# Patient Record
Sex: MALE | Race: WHITE | Employment: OTHER | URBAN - METROPOLITAN AREA
[De-identification: names, ages, dates, MRNs, and addresses within clinical notes are randomized per-mention and may not be internally consistent; named-entity substitution may affect disease eponyms.]

---

## 2019-09-27 ENCOUNTER — ASSISTED LIVING VISIT (OUTPATIENT)
Dept: GERIATRICS | Facility: CLINIC | Age: 74
End: 2019-09-27
Payer: COMMERCIAL

## 2019-09-27 VITALS
OXYGEN SATURATION: 98 % | SYSTOLIC BLOOD PRESSURE: 128 MMHG | DIASTOLIC BLOOD PRESSURE: 84 MMHG | HEART RATE: 64 BPM | RESPIRATION RATE: 18 BRPM

## 2019-09-27 DIAGNOSIS — I10 ESSENTIAL HYPERTENSION: Primary | ICD-10-CM

## 2019-09-27 DIAGNOSIS — G31.09 FRONTAL LOBE DEMENTIA (H): ICD-10-CM

## 2019-09-27 DIAGNOSIS — Z71.89 ADVANCED DIRECTIVES, COUNSELING/DISCUSSION: ICD-10-CM

## 2019-09-27 DIAGNOSIS — F02.80 FRONTAL LOBE DEMENTIA (H): ICD-10-CM

## 2019-09-27 DIAGNOSIS — E78.5 HYPERLIPIDEMIA, UNSPECIFIED HYPERLIPIDEMIA TYPE: ICD-10-CM

## 2019-09-27 DIAGNOSIS — E11.9 DIABETES MELLITUS TYPE 2 WITHOUT RETINOPATHY (H): ICD-10-CM

## 2019-09-27 PROBLEM — F41.1 GENERALIZED ANXIETY DISORDER: Status: ACTIVE | Noted: 2019-09-27

## 2019-09-27 PROBLEM — F32.A DEPRESSION: Status: ACTIVE | Noted: 2019-09-27

## 2019-09-27 PROCEDURE — 99207 ZZC CDG-HISTORY COMP: MEETS EXP. PROBLEM FOCUSED - DOWN CODED LACK OF HPI: CPT | Performed by: NURSE PRACTITIONER

## 2019-09-27 RX ORDER — SIMVASTATIN 20 MG
20 TABLET ORAL AT BEDTIME
COMMUNITY
End: 2021-01-01

## 2019-09-27 RX ORDER — DOCUSATE SODIUM 100 MG/1
100 CAPSULE, LIQUID FILLED ORAL DAILY
COMMUNITY

## 2019-09-27 RX ORDER — QUETIAPINE FUMARATE 25 MG/1
25 TABLET, FILM COATED ORAL 2 TIMES DAILY
COMMUNITY
End: 2019-10-10

## 2019-09-27 RX ORDER — LISINOPRIL AND HYDROCHLOROTHIAZIDE 20; 25 MG/1; MG/1
1 TABLET ORAL DAILY
COMMUNITY

## 2019-09-27 RX ORDER — ATENOLOL 50 MG/1
50 TABLET ORAL DAILY
COMMUNITY
End: 2020-10-21

## 2019-09-27 RX ORDER — DONEPEZIL HYDROCHLORIDE 10 MG/1
10 TABLET, FILM COATED ORAL AT BEDTIME
COMMUNITY
End: 2021-01-01

## 2019-09-27 NOTE — PROGRESS NOTES
[facility]  PRIMARY CARE PROVIDER AND CLINIC:  Timur Ram, SAVANNAH CNP, [address] / [place] [address]  Chief Complaint   Patient presents with     Establish Care     [place] Medical Record Number:  [id number]  [facility] where encounter took place[facility] [[id number]]    Eduardo Singh  is a 73 year old  (1945), admitted to the above facility from home..  Admitted to this facility for  rehab, medical management and nursing care.    HPI:    HPI information obtained from: facility chart records, facility staff, patient report, [facility] chart review, Care Everywhere Epic chart review and family/first contact spouse report.   Brief Summary of Hospital Course:   HTN: cont. On atenolol, zestoretic. BP, HRs stable    Dementia: seen by neurology, started on seroquel 8/19 per neurology.  Has had recent increase agitation at times, typically directed towards spouse. Has exp. Aphasia, easily frustrated when unable to communicate effectively. Cont. On zoloft and aricept.    DMII. Per chart has had impaired glucose fasting. 2/11/19 hgba1c 6.3%. not currently taking DM med.  Po intake has been stable    Hyperlipidemia. Cont. On zocor. No recent FLP    ACP. DNR/DNI          Updates on Status Since Skilled nursing Admission: occ increased agitation, verbal outbursts.  Exp. Aphasia. No reports of insomnia.     CODE STATUS/ADVANCE DIRECTIVES DISCUSSION:   DNR / DNI  Patient's living condition: lives with spouse  ALLERGIES: Patient has no known allergies.  PAST MEDICAL HISTORY:  has a past medical history of Alzheimer disease, Diabetes (H), Erectile dysfunction, Hyperlipidemia, Hypertension, and Impaired fasting glucose.  PAST SURGICAL HISTORY:   has a past surgical history that includes CERVICAL LAMINECTOMY.  FAMILY HISTORY: family history includes Cataracts in his mother; Dementia in his father; Diabetes in his father; Hypertension in his father; Kidney  Disease in his father; Other - See Comments in his mother.  SOCIAL HISTORY:   reports that he has quit smoking. His smoking use included cigarettes. He smoked 0.00 packs per day. He has never used smokeless tobacco.    Post Discharge Medication Reconciliation Status: discharge medications reconciled, continue medications without change    Current Outpatient Medications   Medication Sig Dispense Refill     atenolol (TENORMIN) 50 MG tablet Take 50 mg by mouth daily       docusate sodium (COLACE) 100 MG capsule Take 100 mg by mouth daily       donepezil (ARICEPT) 10 MG tablet Take 10 mg by mouth At Bedtime       lisinopril-hydrochlorothiazide (PRINZIDE/ZESTORETIC) 20-25 MG tablet Take 1 tablet by mouth daily       QUEtiapine (SEROQUEL) 25 MG tablet Take 25 mg by mouth 2 times daily       sertraline (ZOLOFT) 50 MG tablet Take 75 mg by mouth daily       simvastatin (ZOCOR) 20 MG tablet Take 20 mg by mouth At Bedtime         ROS:  Unobtainable secondary to cognitive impairment. Reports feeling fine today    Vitals:  /84   Pulse 64   Resp 18   SpO2 98%   Exam:  GENERAL APPEARANCE:  Alert, in no distress, cooperative  ENT:  Mouth and posterior oropharynx normal, moist mucous membranes, [facility], adequate dentition  EYES:  EOM, conjunctivae, lids, pupils and irises normal, PERRL, no drainage  NECK:  No adenopathy,masses or thyromegaly, no carotid bruit, FROM  RESP:  respiratory effort and palpation of chest normal, lungs clear to auscultation , no respiratory distress  CV:  Palpation and auscultation of heart done , regular rate and rhythm, no murmur, rub, or gallop, no edema  ABDOMEN:  normal bowel sounds, soft, nontender, no hepatosplenomegaly or other masses, no guarding or rebound, no bruits  LYMPHATICS:  No adenopathy in neck , No adenopathy in axillae  M/S:   Gait and station normal  muscle strength 5/5 all 4 ext., normal tone  SKIN:  red skin dorsal surface R hand, chronic purpura  NEURO:   Cranial nerves 2-12 are  normal tested and grossly at patient's baseline, alert, exp. aphasia  PSYCH:  insight and judgement impaired, memory impaired , affect and mood normal    Lab/Diagnostic data:  Recent labs in [facility] reviewed by me today.     ASSESSMENT/PLAN:  Essential hypertension  Currently controlled  1. Cont. Atenolol, zestoretic  2. Follow BPs, HRs  3. Cbc, bmp next week    Frontal lobe dementia (H)  Memory loss, increased agitation at times. Exp. Aphasia  1. Cont. seroquel  2. Cont. Aricept, zoloft  3. Monitor for increased agitation, anxiety, changes in behavior  4. Cont. Secured memory care unit  5. Cont. To invite to activities, tends to isolate in room    Diabetes mellitus type 2 without retinopathy (H)  H/o impaired fasting glucose. Last hgba1c 6.3%. seroquel recently started  1. Bmp next week  2. hgba1c next week  3. For further elevated a1c, may consider metformin    Hyperlipidemia, unspecified hyperlipidemia type  No recent FLP. No current GI distress  1. Cont. zocor  2. FLP next week  3. AST,ALT next week    Advanced directives, counseling/discussion  Spoke with spouse. Cont. DNR/DNI. POLST in chart       Electronically signed by:  SAVANNAH Seymour CNP

## 2019-09-27 NOTE — LETTER
9/27/2019        RE: Eduardo [place]  [address] [place]  [facility] [address]        [facility]  PRIMARY CARE PROVIDER AND CLINIC:  Timur Ram, SAVANNAH CNP, [address] / [place] [address]  Chief Complaint   Patient presents with     Establish Care     [place] Medical Record Number:  [id number]  [facility] of Service where encounter took place:  [facility] ([facility] [[id number]]    Eduardo Singh  is a 73 year old  (1945), admitted to the above facility from home..  Admitted to this facility for  rehab, medical management and nursing care.    HPI:    HPI information obtained from: facility chart records, facility staff, patient report, [facility] chart review, Care Everywhere Epic chart review and family/first contact spouse report.   Brief Summary of Hospital Course:   HTN: cont. On atenolol, zestoretic. BP, HRs stable    Dementia: seen by neurology, started on seroquel 8/19 per neurology.  Has had recent increase agitation at times, typically directed towards spouse. Has exp. Aphasia, easily frustrated when unable to communicate effectively. Cont. On zoloft and aricept.    DMII. Per chart has had impaired glucose fasting. 2/11/19 hgba1c 6.3%. not currently taking DM med.  Po intake has been stable    Hyperlipidemia. Cont. On zocor. No recent FLP    ACP. DNR/DNI          Updates on Status Since Skilled nursing Admission: occ increased agitation, verbal outbursts.  Exp. Aphasia. No reports of insomnia.     CODE STATUS/ADVANCE DIRECTIVES DISCUSSION:   DNR / DNI  Patient's living condition: lives with spouse  ALLERGIES: Patient has no known allergies.  PAST MEDICAL HISTORY:  has a past medical history of Alzheimer disease, Diabetes (H), Erectile dysfunction, Hyperlipidemia, Hypertension, and Impaired fasting glucose.  PAST SURGICAL HISTORY:   has a past surgical history that includes CERVICAL LAMINECTOMY.  FAMILY HISTORY: family history  includes Cataracts in his mother; Dementia in his father; Diabetes in his father; Hypertension in his father; Kidney Disease in his father; Other - See Comments in his mother.  SOCIAL HISTORY:   reports that he has quit smoking. His smoking use included cigarettes. He smoked 0.00 packs per day. He has never used smokeless tobacco.    Post Discharge Medication Reconciliation Status: discharge medications reconciled, continue medications without change    Current Outpatient Medications   Medication Sig Dispense Refill     atenolol (TENORMIN) 50 MG tablet Take 50 mg by mouth daily       docusate sodium (COLACE) 100 MG capsule Take 100 mg by mouth daily       donepezil (ARICEPT) 10 MG tablet Take 10 mg by mouth At Bedtime       lisinopril-hydrochlorothiazide (PRINZIDE/ZESTORETIC) 20-25 MG tablet Take 1 tablet by mouth daily       QUEtiapine (SEROQUEL) 25 MG tablet Take 25 mg by mouth 2 times daily       sertraline (ZOLOFT) 50 MG tablet Take 75 mg by mouth daily       simvastatin (ZOCOR) 20 MG tablet Take 20 mg by mouth At Bedtime         ROS:  Unobtainable secondary to cognitive impairment. Reports feeling fine today    Vitals:  /84   Pulse 64   Resp 18   SpO2 98%   Exam:  GENERAL APPEARANCE:  Alert, in no distress, cooperative  ENT:  Mouth and posterior oropharynx normal, moist mucous membranes, [employer], adequate dentition  EYES:  EOM, conjunctivae, lids, pupils and irises normal, PERRL, no drainage  NECK:  No adenopathy,masses or thyromegaly, no carotid bruit, FROM  RESP:  respiratory effort and palpation of chest normal, lungs clear to auscultation , no respiratory distress  CV:  Palpation and auscultation of heart done , regular rate and rhythm, no murmur, rub, or gallop, no edema  ABDOMEN:  normal bowel sounds, soft, nontender, no hepatosplenomegaly or other masses, no guarding or rebound, no bruits  LYMPHATICS:  No adenopathy in neck , No adenopathy in axillae  M/S:   Gait and station normal  muscle strength  5/5 all 4 ext., normal tone  SKIN:  red skin dorsal surface R hand, chronic purpura  NEURO:   Cranial nerves 2-12 are normal tested and grossly at patient's baseline, alert, exp. aphasia  PSYCH:  insight and judgement impaired, memory impaired , affect and mood normal    Lab/Diagnostic data:  Recent labs in [facility] reviewed by me today.     ASSESSMENT/PLAN:  Essential hypertension  Currently controlled  1. Cont. Atenolol, zestoretic  2. Follow BPs, HRs  3. Cbc, bmp next week    Frontal lobe dementia (H)  Memory loss, increased agitation at times. Exp. Aphasia  1. Cont. seroquel  2. Cont. Aricept, zoloft  3. Monitor for increased agitation, anxiety, changes in behavior  4. Cont. Secured memory care unit  5. Cont. To invite to activities, tends to isolate in room    Diabetes mellitus type 2 without retinopathy (H)  H/o impaired fasting glucose. Last hgba1c 6.3%. seroquel recently started  1. Bmp next week  2. hgba1c next week  3. For further elevated a1c, may consider metformin    Hyperlipidemia, unspecified hyperlipidemia type  No recent FLP. No current GI distress  1. Cont. zocor  2. FLP next week  3. AST,ALT next week    Advanced directives, counseling/discussion  Spoke with spouse. Cont. DNR/DNI. POLST in chart       Electronically signed by:  SAVANNAH Seymour CNP                       Sincerely,        SAVANNAH Seymour CNP

## 2019-10-01 ENCOUNTER — TRANSFERRED RECORDS (OUTPATIENT)
Dept: HEALTH INFORMATION MANAGEMENT | Facility: CLINIC | Age: 74
End: 2019-10-01

## 2019-10-01 ENCOUNTER — RECORDS - HEALTHEAST (OUTPATIENT)
Dept: LAB | Facility: CLINIC | Age: 74
End: 2019-10-01

## 2019-10-01 LAB
ALT SERPL W P-5'-P-CCNC: 21 U/L (ref 0–45)
ALT SERPL-CCNC: 21 U/L (ref 0–45)
ANION GAP SERPL CALCULATED.3IONS-SCNC: 10 MMOL/L (ref 5–18)
ANION GAP SERPL CALCULATED.3IONS-SCNC: 10 MMOL/L (ref 5–18)
AST SERPL W P-5'-P-CCNC: 24 U/L (ref 0–40)
AST SERPL-CCNC: 24 U/L (ref 0–40)
BUN SERPL-MCNC: 19 MG/DL (ref 8–28)
BUN SERPL-MCNC: 19 MG/DL (ref 8–28)
CALCIUM SERPL-MCNC: 9.6 MG/DL (ref 8.5–10.5)
CALCIUM SERPL-MCNC: 9.6 MG/DL (ref 8.5–10.5)
CHLORIDE BLD-SCNC: 94 MMOL/L (ref 98–107)
CHLORIDE SERPLBLD-SCNC: 94 MMOL/L (ref 98–107)
CHOLEST SERPL-MCNC: 170 MG/DL
CHOLEST SERPL-MCNC: 170 MG/DL
CO2 SERPL-SCNC: 28 MMOL/L (ref 22–31)
CO2 SERPL-SCNC: 28 MMOL/L (ref 22–31)
CREAT SERPL-MCNC: 1.21 MG/DL (ref 0.7–1.3)
CREAT SERPL-MCNC: 1.21 MG/DL (ref 0.7–1.3)
ERYTHROCYTE [DISTWIDTH] IN BLOOD BY AUTOMATED COUNT: 11.9 % (ref 11–14.5)
ERYTHROCYTE [DISTWIDTH] IN BLOOD BY AUTOMATED COUNT: 11.9 % (ref 11–14.5)
FASTING STATUS PATIENT QL REPORTED: YES
GFR SERPL CREATININE-BSD FRML MDRD: 59 ML/MIN/1.73M2
GFR SERPL CREATININE-BSD FRML MDRD: 59 ML/MIN/1.73M2
GLUCOSE BLD-MCNC: 118 MG/DL (ref 70–125)
GLUCOSE SERPL-MCNC: 118 MG/DL (ref 70–125)
HBA1C MFR BLD: 6.4 % (ref 4.2–6.1)
HCT VFR BLD AUTO: 43.7 % (ref 40–54)
HCT VFR BLD AUTO: 43.7 % (ref 40–54)
HDLC SERPL-MCNC: 55 MG/DL
HDLC SERPL-MCNC: 55 MG/DL
HEMOGLOBIN: 14.4 G/DL (ref 14–18)
HGB BLD-MCNC: 14.4 G/DL (ref 14–18)
LDLC SERPL CALC-MCNC: 85 MG/DL
LDLC SERPL CALC-MCNC: 85 MG/DL
MCH RBC QN AUTO: 30.3 PG (ref 27–34)
MCH RBC QN AUTO: 30.3 PG (ref 27–34)
MCHC RBC AUTO-ENTMCNC: 33 G/DL (ref 32–36)
MCHC RBC AUTO-ENTMCNC: 33 G/DL (ref 32–36)
MCV RBC AUTO: 92 FL (ref 80–100)
MCV RBC AUTO: 92 FL (ref 80–100)
PLATELET # BLD AUTO: 293 THOU/UL (ref 140–440)
PLATELET # BLD AUTO: 293 THOU/UL (ref 140–440)
PMV BLD AUTO: 9.3 FL (ref 8.5–12.5)
POTASSIUM BLD-SCNC: 3.8 MMOL/L (ref 3.5–5)
POTASSIUM SERPL-SCNC: 3.8 MMOL/L (ref 3.5–5)
RBC # BLD AUTO: 4.76 MILL/UL (ref 4.4–6.2)
RBC # BLD AUTO: 4.76 MILL/UL (ref 4.4–6.2)
SODIUM SERPL-SCNC: 132 MMOL/L (ref 136–145)
SODIUM SERPL-SCNC: 132 MMOL/L (ref 136–145)
TRIGL SERPL-MCNC: 150 MG/DL
TRIGL SERPL-MCNC: 150 MG/DL
WBC # BLD AUTO: 9.1 THOU/UL (ref 4–11)
WBC: 9.1 THOU/UL (ref 4–11)

## 2019-10-02 LAB — HBA1C MFR BLD: 6.4 % (ref 4.2–6.1)

## 2019-10-09 ENCOUNTER — ASSISTED LIVING VISIT (OUTPATIENT)
Dept: GERIATRICS | Facility: CLINIC | Age: 74
End: 2019-10-09
Payer: COMMERCIAL

## 2019-10-09 DIAGNOSIS — F41.1 GENERALIZED ANXIETY DISORDER: ICD-10-CM

## 2019-10-09 DIAGNOSIS — G31.09 FRONTAL LOBE DEMENTIA (H): ICD-10-CM

## 2019-10-09 DIAGNOSIS — F02.80 FRONTAL LOBE DEMENTIA (H): ICD-10-CM

## 2019-10-09 DIAGNOSIS — I10 ESSENTIAL HYPERTENSION: Primary | ICD-10-CM

## 2019-10-09 NOTE — LETTER
10/9/2019        RE: Eduardo Cleveland [place]  [address]  [facility] [address]        [facility] [facility]  [place] Medical Record Number:  [id number]  Place of Service where encounter took place:  [facility] ([facility] [[id number]]  Chief Complaint   Patient presents with     Hypertension     Dementia       HPI:    Eduardo Singh  is a 73 year old (1945), who is being seen today for an episodic care visit.  HPI information obtained from: facility chart records, facility staff, patient report and [facility] chart review. Today's concern is: HTN, dementia, anxiety. For HTN cont. On ate olol, lisinopril/hctz. BP sl elevated today, had been stable. Per staff po intake stable. Has had increased anxiety at times, labile mood. Strikes out at staff at times, appears suspicious of staff when taking meds at times, med refusal at times.  Cont. On zoloft, seroquel. seroquel had george recently started PTA.       Past Medical and Surgical History reviewed in Epic today.    MEDICATIONS:  Current Outpatient Medications   Medication Sig Dispense Refill     atenolol (TENORMIN) 50 MG tablet Take 50 mg by mouth daily       docusate sodium (COLACE) 100 MG capsule Take 100 mg by mouth daily       donepezil (ARICEPT) 10 MG tablet Take 10 mg by mouth At Bedtime       lisinopril-hydrochlorothiazide (PRINZIDE/ZESTORETIC) 20-25 MG tablet Take 1 tablet by mouth daily       QUEtiapine (SEROQUEL) 25 MG tablet Take 25 mg by mouth 3 times daily       sertraline (ZOLOFT) 50 MG tablet Take 75 mg by mouth daily       simvastatin (ZOCOR) 20 MG tablet Take 20 mg by mouth At Bedtime           REVIEW OF SYSTEMS:  Unobtainable secondary to cognitive impairment. Reports feeling ok today    Objective:  BP (!) 159/81   Pulse 70   Resp 16   SpO2 96%   Exam:  GENERAL APPEARANCE:  Alert, in no distress, cooperative  ENT:  Mouth and posterior oropharynx normal, moist mucous membranes, normal hearing  acuity  EYES:  EOM, conjunctivae, lids, pupils and irises normal, PERRL  NECK:  No adenopathy,masses or thyromegaly, no carotid bruit  RESP:  respiratory effort and palpation of chest normal, lungs clear to auscultation , no respiratory distress  CV:  Palpation and auscultation of heart done , regular rate and rhythm, no murmur, rub, or gallop, no edema  ABDOMEN:  normal bowel sounds, soft, nontender, no hepatosplenomegaly or other masses, no guarding or rebound  M/S:   Gait and station normal  muscle strength 5/5 all 4 ext., normal tone  NEURO:   Cranial nerves 2-12 are normal tested and grossly at patient's baseline, alert, exp. aphasia  PSYCH:  memory impaired , affect and mood normal    Labs:     Most Recent 3 CBC's:  Recent Labs   Lab Test 10/01/19   WBC 9.1   HGB 14.4   MCV 92        Most Recent 3 BMP's:  Recent Labs   Lab Test 10/01/19   *   POTASSIUM 3.8   CHLORIDE 94*   CO2 28   BUN 19   CR 1.21   ANIONGAP 10   ALOK 9.6        Most Recent Hemoglobin A1c:  Recent Labs   Lab Test 10/01/19   A1C 6.4*       ASSESSMENT/PLAN:  Essential hypertension  Elevated today  1. Cont. Atenolol  2. Cont. Lisinopril/hctz  3. Follow BPs, HRs  4. Reassess over next couple weeks  5. For further elevated BPs, may increase BP meds  6. Bmp in next 1-3 mos    Frontal lobe dementia (H)  Increased aggressive behaviors at times, paranoia  1. Increase seroquel to 25 mg tid  2. Monitor for further aggressive behaviors  3. Reassess over next couple weeks  4. For ongoing target behaviors may further increased seroquel    Generalized anxiety disorder  Ongoing s/s  1. Cont zoloft  2. Monitor for further anxiety over next couple weeks  3. For ongoing s/s may consider prn neurontin/increased zoloft dose      Electronically signed by:  SAVANNAH Seymour CNP             Sincerely,        SAVANNAH Seymour CNP

## 2019-10-09 NOTE — PROGRESS NOTES
[place] [facility]  [place] Medical Record Number:  [id number]  Place of Service where encounter took place:  [facility] ([facility] [[id number]]  Chief Complaint   Patient presents with     Hypertension     Dementia       HPI:    Eduardo Singh  is a 73 year old (1945), who is being seen today for an episodic care visit.  HPI information obtained from: facility chart records, facility staff, patient report and [facility] chart review. Today's concern is: HTN, dementia, anxiety. For HTN cont. On ate olol, lisinopril/hctz. BP sl elevated today, had been stable. Per staff po intake stable. Has had increased anxiety at times, labile mood. Strikes out at staff at times, appears suspicious of staff when taking meds at times, med refusal at times.  Cont. On zoloft, seroquel. seroquel had george recently started PTA.       Past Medical and Surgical History reviewed in Epic today.    MEDICATIONS:  Current Outpatient Medications   Medication Sig Dispense Refill     atenolol (TENORMIN) 50 MG tablet Take 50 mg by mouth daily       docusate sodium (COLACE) 100 MG capsule Take 100 mg by mouth daily       donepezil (ARICEPT) 10 MG tablet Take 10 mg by mouth At Bedtime       lisinopril-hydrochlorothiazide (PRINZIDE/ZESTORETIC) 20-25 MG tablet Take 1 tablet by mouth daily       QUEtiapine (SEROQUEL) 25 MG tablet Take 25 mg by mouth 3 times daily       sertraline (ZOLOFT) 50 MG tablet Take 75 mg by mouth daily       simvastatin (ZOCOR) 20 MG tablet Take 20 mg by mouth At Bedtime           REVIEW OF SYSTEMS:  Unobtainable secondary to cognitive impairment. Reports feeling ok today    Objective:  BP (!) 159/81   Pulse 70   Resp 16   SpO2 96%   Exam:  GENERAL APPEARANCE:  Alert, in no distress, cooperative  ENT:  Mouth and posterior oropharynx normal, moist mucous membranes, normal hearing acuity  EYES:  EOM, conjunctivae, lids, pupils and irises normal, PERRL  NECK:  No adenopathy,masses or thyromegaly, no  carotid bruit  RESP:  respiratory effort and palpation of chest normal, lungs clear to auscultation , no respiratory distress  CV:  Palpation and auscultation of heart done , regular rate and rhythm, no murmur, rub, or gallop, no edema  ABDOMEN:  normal bowel sounds, soft, nontender, no hepatosplenomegaly or other masses, no guarding or rebound  M/S:   Gait and station normal  muscle strength 5/5 all 4 ext., normal tone  NEURO:   Cranial nerves 2-12 are normal tested and grossly at patient's baseline, alert, exp. aphasia  PSYCH:  memory impaired , affect and mood normal    Labs:     Most Recent 3 CBC's:  Recent Labs   Lab Test 10/01/19   WBC 9.1   HGB 14.4   MCV 92        Most Recent 3 BMP's:  Recent Labs   Lab Test 10/01/19   *   POTASSIUM 3.8   CHLORIDE 94*   CO2 28   BUN 19   CR 1.21   ANIONGAP 10   ALOK 9.6        Most Recent Hemoglobin A1c:  Recent Labs   Lab Test 10/01/19   A1C 6.4*       ASSESSMENT/PLAN:  Essential hypertension  Elevated today  1. Cont. Atenolol  2. Cont. Lisinopril/hctz  3. Follow BPs, HRs  4. Reassess over next couple weeks  5. For further elevated BPs, may increase BP meds  6. Bmp in next 1-3 mos    Frontal lobe dementia (H)  Increased aggressive behaviors at times, paranoia  1. Increase seroquel to 25 mg tid  2. Monitor for further aggressive behaviors  3. Reassess over next couple weeks  4. For ongoing target behaviors may further increased seroquel    Generalized anxiety disorder  Ongoing s/s  1. Cont zoloft  2. Monitor for further anxiety over next couple weeks  3. For ongoing s/s may consider prn neurontin/increased zoloft dose      Electronically signed by:  SAVANNAH Seymour CNP

## 2019-10-10 VITALS
HEART RATE: 70 BPM | DIASTOLIC BLOOD PRESSURE: 81 MMHG | RESPIRATION RATE: 16 BRPM | SYSTOLIC BLOOD PRESSURE: 159 MMHG | OXYGEN SATURATION: 96 %

## 2019-10-10 RX ORDER — QUETIAPINE FUMARATE 25 MG/1
25 TABLET, FILM COATED ORAL 3 TIMES DAILY
COMMUNITY
End: 2020-01-27

## 2019-11-01 ENCOUNTER — ASSISTED LIVING VISIT (OUTPATIENT)
Dept: GERIATRICS | Facility: CLINIC | Age: 74
End: 2019-11-01
Payer: COMMERCIAL

## 2019-11-01 VITALS
TEMPERATURE: 98.2 F | HEART RATE: 62 BPM | DIASTOLIC BLOOD PRESSURE: 70 MMHG | RESPIRATION RATE: 18 BRPM | SYSTOLIC BLOOD PRESSURE: 126 MMHG | WEIGHT: 174 LBS

## 2019-11-01 DIAGNOSIS — N18.30 TYPE 2 DIABETES MELLITUS WITH STAGE 3 CHRONIC KIDNEY DISEASE, WITHOUT LONG-TERM CURRENT USE OF INSULIN (H): ICD-10-CM

## 2019-11-01 DIAGNOSIS — E78.5 HYPERLIPIDEMIA, UNSPECIFIED HYPERLIPIDEMIA TYPE: ICD-10-CM

## 2019-11-01 DIAGNOSIS — F22 PARANOIA (H): ICD-10-CM

## 2019-11-01 DIAGNOSIS — I10 ESSENTIAL HYPERTENSION: Primary | ICD-10-CM

## 2019-11-01 DIAGNOSIS — E11.22 TYPE 2 DIABETES MELLITUS WITH STAGE 3 CHRONIC KIDNEY DISEASE, WITHOUT LONG-TERM CURRENT USE OF INSULIN (H): ICD-10-CM

## 2019-11-01 DIAGNOSIS — F02.80 FRONTAL LOBE DEMENTIA (H): ICD-10-CM

## 2019-11-01 DIAGNOSIS — G31.09 FRONTAL LOBE DEMENTIA (H): ICD-10-CM

## 2019-11-01 DIAGNOSIS — F41.1 GENERALIZED ANXIETY DISORDER: ICD-10-CM

## 2019-11-01 NOTE — LETTER
11/1/2019        RE: Eduardo Singh  [facility]  [address] [place]  [facility] [address]        Eduardo Singh is a 73 year old male seen November 1, 2019 at [facility] where he has resided for 1 month (admit 9/2019) seen for initial visit.   He is seen in his room, ambulatory without device.   States he feels well, pleasant and smiling.   Significant impairment of his verbal skills, but says no to any pain or discomfort.     By chart review, patient has had progressive cognitive decline over the past 8 years, particularly with language.   He was seen by Neurology in 10/2013, noted to have a + family history of Alzheimer's dementia in first degree relatives    Pt thought to have early onset Alzheimer's dementia vs frontotemporal dementia and started on donepezil.   He had gradual progression until this past year, when he had more rapid changes and also developed agitation    He was started on quetiapine by his Neurologist    He initially admitted to [facility] for a respite stay, but wife has decided to have patient stay long term.  He had some trouble with transition at which time he was more paranoid and combative, and quetiapine dose was increased.    He goes to an Adult Day program, [facility] in [place], twice a week.       Past Medical History:   Diagnosis Date     Alzheimer disease (H)      Diabetes (H)      Erectile dysfunction      Hyperlipidemia      Hypertension      Impaired fasting glucose      Past Surgical History:   Procedure Laterality Date     CERVICAL LAMINECTOMY       SH:  Previously lived with his wife in [place]. They have 3 children.   Pt is retired from work as an  and controller.    ROS: unable to obtain secondary to aphasia.      EXAM:  NAD  /70   Pulse 62   Temp 98.2  F (36.8  C)   Resp 18   Wt 78.9 kg (174 lb)    Neck supple without adenopathy  Lungs clear bilaterally with fair air movement  Heart RRR s1s2  Abd soft, NT, no  distention, +BS  Ext without edema  Neuro: +aphasia, steady gait  Psych: affect okay, smiling    Last Comprehensive Metabolic Panel:  Sodium   Date Value Ref Range Status   10/01/2019 132 (A) 136 - 145 mmol/L Final     Potassium   Date Value Ref Range Status   10/01/2019 3.8 3.5 - 5.0 mmol/L Final     Chloride   Date Value Ref Range Status   10/01/2019 94 (A) 98 - 107 mmol/L Final     Carbon Dioxide   Date Value Ref Range Status   10/01/2019 28 22 - 31 mmol/L Final     Anion Gap   Date Value Ref Range Status   10/01/2019 10 5 - 18 mmol/L Final     Glucose   Date Value Ref Range Status   10/01/2019 118 70 - 125 mg/dL Final     Urea Nitrogen   Date Value Ref Range Status   10/01/2019 19 8 - 28 mg/dL Final     Creatinine   Date Value Ref Range Status   10/01/2019 1.21 0.70 - 1.30 mg/dL Final     GFR Estimate   Date Value Ref Range Status   10/01/2019 59 (L) >60 ml/min/1.73m2 Final     Calcium   Date Value Ref Range Status   10/01/2019 9.6 8.5 - 10.5 mg/dL Final     Lab Results   Component Value Date    AST 24 10/01/2019     Lab Results   Component Value Date    ALT 21 10/01/2019     Lab Results   Component Value Date    WBC 9.1 10/01/2019      HGB 14.4 10/01/2019      MCV 92 10/01/2019       10/01/2019     Lab Results   Component Value Date    CHOL 170 10/01/2019     Lab Results   Component Value Date    HDL 55 10/01/2019     Lab Results   Component Value Date    LDL 85 10/01/2019     Lab Results   Component Value Date    TRIG 150 10/01/2019       IMP/PLAN:     (I10) Essential hypertension    Comment:   BP Readings from Last 3 Encounters:   11/01/19 126/70   10/09/19 (!) 159/81   09/27/19 128/84      Pulse Readings from Last 4 Encounters:   11/01/19 62   10/09/19 70   09/27/19 64      Plan: lisinopril 20 mg/day, atenolol 50 mg daily and hydrochlorothiazide 25 mg/day    Will need to monitor for bradycardia on both atenolol and donepezil, and follow Na on hydrochlorothiazide.   Would look to decreasing dose if Na  falls further.       (E11.22,  N18.3) Type 2 diabetes mellitus with stage 3 chronic kidney disease, without long-term current use of insulin (H)  Comment:   Lab Results   Component Value Date    A1C 6.4 10/01/2019   Plan: diet-controlled.     He is on an ACEI and statin.        (F22) Paranoia (H)  Comment: with agitation and combativeness related to his worsening dementia    Plan: quetiapine 25 mg tid.  May be able to try GDR once he is settled.        (F41.1) Generalized anxiety disorder  Comment: worsened with cognitive decline     Plan: sertraline 75 mg/day        (G31.09,  F02.80) Frontal lobe dementia (H)  Comment: with loss of language and low functional status     Plan: [facility] for assist with meds, meals, activity.   He has been on donepezil >5 years, currently on 10 mg/day.    Monitor for bradycardia / GI symptoms.       Claudia Pascual MD       Sincerely,        Claudia Pascual MD

## 2019-11-05 PROBLEM — E11.22 TYPE 2 DIABETES MELLITUS WITH STAGE 3 CHRONIC KIDNEY DISEASE, WITHOUT LONG-TERM CURRENT USE OF INSULIN (H): Status: ACTIVE | Noted: 2019-11-05

## 2019-11-05 PROBLEM — N18.30 TYPE 2 DIABETES MELLITUS WITH STAGE 3 CHRONIC KIDNEY DISEASE, WITHOUT LONG-TERM CURRENT USE OF INSULIN (H): Status: ACTIVE | Noted: 2019-11-05

## 2019-12-17 ENCOUNTER — PATIENT OUTREACH (OUTPATIENT)
Dept: GERIATRIC MEDICINE | Facility: CLINIC | Age: 74
End: 2019-12-17

## 2020-01-01 ENCOUNTER — ASSISTED LIVING VISIT (OUTPATIENT)
Dept: GERIATRICS | Facility: CLINIC | Age: 75
End: 2020-01-01
Payer: COMMERCIAL

## 2020-01-01 ENCOUNTER — VIRTUAL VISIT (OUTPATIENT)
Dept: GERIATRICS | Facility: CLINIC | Age: 75
End: 2020-01-01
Payer: COMMERCIAL

## 2020-01-01 VITALS
SYSTOLIC BLOOD PRESSURE: 115 MMHG | HEART RATE: 59 BPM | DIASTOLIC BLOOD PRESSURE: 81 MMHG | OXYGEN SATURATION: 92 % | RESPIRATION RATE: 16 BRPM

## 2020-01-01 VITALS
OXYGEN SATURATION: 93 % | DIASTOLIC BLOOD PRESSURE: 64 MMHG | HEART RATE: 60 BPM | RESPIRATION RATE: 18 BRPM | SYSTOLIC BLOOD PRESSURE: 130 MMHG

## 2020-01-01 VITALS
SYSTOLIC BLOOD PRESSURE: 148 MMHG | HEART RATE: 51 BPM | RESPIRATION RATE: 16 BRPM | DIASTOLIC BLOOD PRESSURE: 82 MMHG | WEIGHT: 160 LBS

## 2020-01-01 DIAGNOSIS — R00.1 BRADYCARDIA: Primary | ICD-10-CM

## 2020-01-01 DIAGNOSIS — G31.09 FRONTAL LOBE DEMENTIA (H): ICD-10-CM

## 2020-01-01 DIAGNOSIS — R00.1 BRADYCARDIA: ICD-10-CM

## 2020-01-01 DIAGNOSIS — M54.50 MIDLINE LOW BACK PAIN WITHOUT SCIATICA, UNSPECIFIED CHRONICITY: Primary | ICD-10-CM

## 2020-01-01 DIAGNOSIS — R63.4 WEIGHT LOSS: Primary | ICD-10-CM

## 2020-01-01 DIAGNOSIS — F02.80 FRONTAL LOBE DEMENTIA (H): ICD-10-CM

## 2020-01-01 RX ORDER — DIVALPROEX SODIUM 125 MG/1
125 TABLET, DELAYED RELEASE ORAL
COMMUNITY

## 2020-01-01 RX ORDER — HALOPERIDOL 0.5 MG/1
0.5 TABLET ORAL DAILY
COMMUNITY
End: 2021-01-01 | Stop reason: ALTCHOICE

## 2020-01-22 ENCOUNTER — ASSISTED LIVING VISIT (OUTPATIENT)
Dept: GERIATRICS | Facility: CLINIC | Age: 75
End: 2020-01-22
Payer: COMMERCIAL

## 2020-01-22 DIAGNOSIS — G31.09 FRONTAL LOBE DEMENTIA (H): ICD-10-CM

## 2020-01-22 DIAGNOSIS — I10 ESSENTIAL HYPERTENSION: ICD-10-CM

## 2020-01-22 DIAGNOSIS — F02.80 FRONTAL LOBE DEMENTIA (H): ICD-10-CM

## 2020-01-22 DIAGNOSIS — F41.1 GENERALIZED ANXIETY DISORDER: Primary | ICD-10-CM

## 2020-01-22 NOTE — LETTER
1/22/2020        RE: Eduardo Cleveland [facility]  [address] [facility] [address]        [place] [facility]  [place] Medical Record Number:  [id number]  Place of Service where encounter took place:  [facility] [[id number]]  Chief Complaint   Patient presents with     Anxiety       HPI:    Eduardo Singh  is a 74 year old (1945), who is being seen today for an episodic care visit.  HPI information obtained from: facility chart records, facility staff and [facility] chart review. Today's concern is: anxiety, dementia, HTN. For past couple weeks has had increased daytime anxiety, aggressive behaviors towards staff at times.  Has utilized prn seroquel which is effective at times.  Earlier this week started on increased seroquel dose. Per staff, mood, behaviors more stable this am.  Also taking zoloft, aricept.  For HTN taking lisinopril/HCTZ, atenolol.       Past Medical and Surgical History reviewed in Epic today.    MEDICATIONS:  Current Outpatient Medications   Medication Sig Dispense Refill     QUEtiapine (SEROQUEL) 50 MG tablet Take 50 mg by mouth 3 times daily       atenolol (TENORMIN) 50 MG tablet Take 50 mg by mouth daily       docusate sodium (COLACE) 100 MG capsule Take 100 mg by mouth daily       donepezil (ARICEPT) 10 MG tablet Take 10 mg by mouth At Bedtime       lisinopril-hydrochlorothiazide (PRINZIDE/ZESTORETIC) 20-25 MG tablet Take 1 tablet by mouth daily       sertraline (ZOLOFT) 50 MG tablet Take 75 mg by mouth daily       simvastatin (ZOCOR) 20 MG tablet Take 20 mg by mouth At Bedtime           REVIEW OF SYSTEMS:  Unobtainable secondary to cognitive impairment.     Objective:  /76   Pulse 74   Resp 16   SpO2 93%   Exam:  GENERAL APPEARANCE:  Alert, in no distress, cooperative  ENT:  Mouth and posterior oropharynx normal, moist mucous membranes, normal hearing acuity  EYES:  EOM, conjunctivae, lids, pupils and irises  normal, PERRL  NECK:  No adenopathy,masses or thyromegaly, no carotid bruit  RESP:  respiratory effort and palpation of chest normal, lungs clear to auscultation , no respiratory distress  CV:  Palpation and auscultation of heart done , regular rate and rhythm, no murmur, rub, or gallop, no edema  ABDOMEN:  normal bowel sounds, soft, nontender, no hepatosplenomegaly or other masses, no guarding or rebound  M/S:   Gait and station normal  muscle strength 5/5 all m4 ext., normal tone  NEURO:   Cranial nerves 2-12 are normal tested and grossly at patient's baseline, alert, aphasia  PSYCH:  affect and mood normal, able to follow commands    Labs:     Most Recent 3 CBC's:  Recent Labs   Lab Test 10/01/19   WBC 9.1   HGB 14.4   MCV 92        Most Recent 3 BMP's:  Recent Labs   Lab Test 10/01/19   *   POTASSIUM 3.8   CHLORIDE 94*   CO2 28   BUN 19   CR 1.21   ANIONGAP 10   ALOK 9.6          ASSESSMENT/PLAN:  Generalized anxiety disorder  Recent increased s/s  1. Cont. zoloft  2. Cont. Increased seroquel dose  3. For further increased s/s, may increase zoloft dose    Frontal lobe dementia (H)  Memory loss, aphasia, recent increased aggressive behaviors, more stable with increased seroquel dose  1. Cont. Sched. seroquel  2. Cont. aricept  3. Reassess behaviors over next week  4. For ongoing target behaviors add prn seroquel    Essential hypertension  Currently stable  1. Cont. Lisinopril/hctz  2. Cont. Atenolol  3. Follow BPs, HRs  4. Bmp in next 1-3 mos        Electronically signed by:  SAVANNAH Seymour CNP               Sincerely,        SAVANNAH Seymour CNP

## 2020-01-22 NOTE — PROGRESS NOTES
[place] [facility]  [place] Medical Record Number:  [id number]  Place of Service where encounter took place:  [facility] [[id number]]  Chief Complaint   Patient presents with     Anxiety       HPI:    Eduardo Singh  is a 74 year old (1945), who is being seen today for an episodic care visit.  HPI information obtained from: facility chart records, facility staff and [facility] chart review. Today's concern is: anxiety, dementia, HTN. For past couple weeks has had increased daytime anxiety, aggressive behaviors towards staff at times.  Has utilized prn seroquel which is effective at times.  Earlier this week started on increased seroquel dose. Per staff, mood, behaviors more stable this am.  Also taking zoloft, aricept.  For HTN taking lisinopril/HCTZ, atenolol.       Past Medical and Surgical History reviewed in Epic today.    MEDICATIONS:  Current Outpatient Medications   Medication Sig Dispense Refill     QUEtiapine (SEROQUEL) 50 MG tablet Take 50 mg by mouth 3 times daily       atenolol (TENORMIN) 50 MG tablet Take 50 mg by mouth daily       docusate sodium (COLACE) 100 MG capsule Take 100 mg by mouth daily       donepezil (ARICEPT) 10 MG tablet Take 10 mg by mouth At Bedtime       lisinopril-hydrochlorothiazide (PRINZIDE/ZESTORETIC) 20-25 MG tablet Take 1 tablet by mouth daily       sertraline (ZOLOFT) 50 MG tablet Take 75 mg by mouth daily       simvastatin (ZOCOR) 20 MG tablet Take 20 mg by mouth At Bedtime           REVIEW OF SYSTEMS:  Unobtainable secondary to cognitive impairment.     Objective:  /76   Pulse 74   Resp 16   SpO2 93%   Exam:  GENERAL APPEARANCE:  Alert, in no distress, cooperative  ENT:  Mouth and posterior oropharynx normal, moist mucous membranes, normal hearing acuity  EYES:  EOM, conjunctivae, lids, pupils and irises normal, PERRL  NECK:  No adenopathy,masses or thyromegaly, no carotid bruit  RESP:  respiratory effort and palpation of chest  normal, lungs clear to auscultation , no respiratory distress  CV:  Palpation and auscultation of heart done , regular rate and rhythm, no murmur, rub, or gallop, no edema  ABDOMEN:  normal bowel sounds, soft, nontender, no hepatosplenomegaly or other masses, no guarding or rebound  M/S:   Gait and station normal  muscle strength 5/5 all m4 ext., normal tone  NEURO:   Cranial nerves 2-12 are normal tested and grossly at patient's baseline, alert, aphasia  PSYCH:  affect and mood normal, able to follow commands    Labs:     Most Recent 3 CBC's:  Recent Labs   Lab Test 10/01/19   WBC 9.1   HGB 14.4   MCV 92        Most Recent 3 BMP's:  Recent Labs   Lab Test 10/01/19   *   POTASSIUM 3.8   CHLORIDE 94*   CO2 28   BUN 19   CR 1.21   ANIONGAP 10   ALOK 9.6          ASSESSMENT/PLAN:  Generalized anxiety disorder  Recent increased s/s  1. Cont. zoloft  2. Cont. Increased seroquel dose  3. For further increased s/s, may increase zoloft dose    Frontal lobe dementia (H)  Memory loss, aphasia, recent increased aggressive behaviors, more stable with increased seroquel dose  1. Cont. Sched. seroquel  2. Cont. aricept  3. Reassess behaviors over next week  4. For ongoing target behaviors add prn seroquel    Essential hypertension  Currently stable  1. Cont. Lisinopril/hctz  2. Cont. Atenolol  3. Follow BPs, HRs  4. Bmp in next 1-3 mos        Electronically signed by:  SAVANNAH Seymour CNP

## 2020-01-27 VITALS
SYSTOLIC BLOOD PRESSURE: 125 MMHG | HEART RATE: 74 BPM | RESPIRATION RATE: 16 BRPM | OXYGEN SATURATION: 93 % | DIASTOLIC BLOOD PRESSURE: 76 MMHG

## 2020-01-27 RX ORDER — QUETIAPINE FUMARATE 50 MG/1
50 TABLET, FILM COATED ORAL 3 TIMES DAILY
COMMUNITY
End: 2020-04-10

## 2020-02-01 ENCOUNTER — PATIENT OUTREACH (OUTPATIENT)
Dept: GERIATRIC MEDICINE | Facility: CLINIC | Age: 75
End: 2020-02-01

## 2020-02-01 NOTE — PROGRESS NOTES
[facility] [employer] Medicare Initial enrollment    Member was assigned to [facility] for [employer] Medicare Case Management on: 12-1-19  Nivia WILSON   [facility] Care Coordinator   [phone]

## 2020-02-01 NOTE — PROGRESS NOTES
[facility] Medicare    Member was assigned to [facility] for [employer] Medicare Case Management   Review of member's Epic chart completed.  No triggering events notified.  Follow up as needed.  Nivia Tavera MA [facility] Care Coordinator   [phone]

## 2020-02-05 ENCOUNTER — RECORDS - HEALTHEAST (OUTPATIENT)
Dept: LAB | Facility: CLINIC | Age: 75
End: 2020-02-05

## 2020-02-05 ENCOUNTER — TRANSFERRED RECORDS (OUTPATIENT)
Dept: HEALTH INFORMATION MANAGEMENT | Facility: CLINIC | Age: 75
End: 2020-02-05

## 2020-02-05 LAB
ANION GAP SERPL CALCULATED.3IONS-SCNC: 10 MMOL/L (ref 5–18)
ANION GAP SERPL CALCULATED.3IONS-SCNC: 10 MMOL/L (ref 5–18)
BUN SERPL-MCNC: 17 MG/DL (ref 8–28)
BUN SERPL-MCNC: 17 MG/DL (ref 8–28)
CALCIUM SERPL-MCNC: 9.6 MG/DL (ref 8.5–10.5)
CALCIUM SERPL-MCNC: 9.6 MG/DL (ref 8.5–10.5)
CHLORIDE BLD-SCNC: 100 MMOL/L (ref 98–107)
CHLORIDE SERPLBLD-SCNC: 100 MMOL/L (ref 98–107)
CO2 SERPL-SCNC: 26 MMOL/L (ref 22–31)
CO2 SERPL-SCNC: 26 MMOL/L (ref 22–31)
CREAT SERPL-MCNC: 1.27 MG/DL (ref 0.7–1.3)
CREAT SERPL-MCNC: 1.27 MG/DL (ref 0.7–1.3)
GFR SERPL CREATININE-BSD FRML MDRD: 55 ML/MIN/1.73M2
GFR SERPL CREATININE-BSD FRML MDRD: 55 ML/MIN/1.73M2
GLUCOSE BLD-MCNC: 127 MG/DL (ref 70–125)
GLUCOSE SERPL-MCNC: 127 MG/DL (ref 70–125)
POTASSIUM BLD-SCNC: 3.9 MMOL/L (ref 3.5–5)
POTASSIUM SERPL-SCNC: 3.9 MMOL/L (ref 3.5–5)
SODIUM SERPL-SCNC: 136 MMOL/L (ref 136–145)
SODIUM SERPL-SCNC: 136 MMOL/L (ref 136–145)

## 2020-04-10 ENCOUNTER — VIRTUAL VISIT (OUTPATIENT)
Dept: GERIATRICS | Facility: CLINIC | Age: 75
End: 2020-04-10
Payer: COMMERCIAL

## 2020-04-10 VITALS — DIASTOLIC BLOOD PRESSURE: 84 MMHG | HEART RATE: 60 BPM | RESPIRATION RATE: 18 BRPM | SYSTOLIC BLOOD PRESSURE: 155 MMHG

## 2020-04-10 DIAGNOSIS — N18.30 TYPE 2 DIABETES MELLITUS WITH STAGE 3 CHRONIC KIDNEY DISEASE, WITHOUT LONG-TERM CURRENT USE OF INSULIN (H): ICD-10-CM

## 2020-04-10 DIAGNOSIS — F02.80 FRONTAL LOBE DEMENTIA (H): ICD-10-CM

## 2020-04-10 DIAGNOSIS — I10 ESSENTIAL HYPERTENSION: Primary | ICD-10-CM

## 2020-04-10 DIAGNOSIS — G31.09 FRONTAL LOBE DEMENTIA (H): ICD-10-CM

## 2020-04-10 DIAGNOSIS — E11.22 TYPE 2 DIABETES MELLITUS WITH STAGE 3 CHRONIC KIDNEY DISEASE, WITHOUT LONG-TERM CURRENT USE OF INSULIN (H): ICD-10-CM

## 2020-04-10 RX ORDER — QUETIAPINE FUMARATE 50 MG/1
50 TABLET, FILM COATED ORAL 3 TIMES DAILY
COMMUNITY
End: 2020-07-14

## 2020-04-10 RX ORDER — DIVALPROEX SODIUM 250 MG/1
250 TABLET, EXTENDED RELEASE ORAL DAILY
COMMUNITY
End: 2020-07-06

## 2020-04-10 NOTE — PROGRESS NOTES
"[place] [facility]   Eduardo Singh is being evaluated via a billable video visit due to the restrictions of the Covid-19 pandemic.   The patient has been notified of following:  \"This video visit will be conducted via a call between you and your provider. We have found that certain health care needs can be provided without the need for an in-person physical exam.  This service lets us provide the care you need with a video conversation. If during the course of the call the provider feels a video visit is not appropriate, you will not be charged for this service.\"   The provider has received verbal consent for a Video Visit from the patient or first contact? Yes  Patient  or facility staff would like the video invitation sent by: Text to cell phone: [phone]  Video Start Time: 0900    [place] Medical Record Number:  [id number]  Place of Location at the time of visit: [facility]   Chief Complaint   Patient presents with     Hypertension     Dementia     HPI:  Eduardo Singh  is a 74 year old (1945), who is being seen today for a visit.  HPI information obtained from: facility chart records, facility staff, patient report and [facility] chart review. Today's concern is: HTN, dementia, DMII.  For HTN cont. On atenolol, lisinopril-hydrochlorothiazide. BPs gen. Stable. Sl elevated today.  HRs gen stable in 60s. occ has HRs upper 50s.  Atenolol occ held per parameter of HR< 60.  Cont. On aricept for dementia. Also taking seroquel. depakote recently added due to increased anxiety, aggressive behaviors, labile mood.  Since this point, no recent reports of aggressive behaviors. Mood more stable per staff.  Gait steady. Has DMII diet controlled. 10/19 hgba1c 6.4%. last GLC level 127. Po intake stable.        Past Medical and Surgical History reviewed in Epic today.  MEDICATIONS:    Current Outpatient Medications   Medication Sig Dispense Refill     divalproex sodium " extended-release (DEPAKOTE ER) 250 MG 24 hr tablet Take 250 mg by mouth daily       QUEtiapine (SEROQUEL) 50 MG tablet Take 50 mg by mouth 3 times daily And 25 mg every day prn       atenolol (TENORMIN) 50 MG tablet Take 50 mg by mouth daily       docusate sodium (COLACE) 100 MG capsule Take 100 mg by mouth daily       donepezil (ARICEPT) 10 MG tablet Take 10 mg by mouth At Bedtime       lisinopril-hydrochlorothiazide (PRINZIDE/ZESTORETIC) 20-25 MG tablet Take 1 tablet by mouth daily       sertraline (ZOLOFT) 50 MG tablet Take 75 mg by mouth daily       simvastatin (ZOCOR) 20 MG tablet Take 20 mg by mouth At Bedtime       REVIEW OF SYSTEMS: Unobtainable secondary to cognitive impairment. Reports feeling ok today  Objective: BP (!) 155/84   Pulse 60   Resp 18   Limited visit exam done given COVID-19 precautions.   GENERAL APPEARANCE:  Alert, in no distress, cooperative  ENT:  [place], adequate dentition  EYES:  PERRL, no drainage  NECK:  FROM  RESP:  no respiratory distress, no apparent cough  CV:  peripheral edema trace+ in LEs  M/S:   Gait and station normal  moves all 4 ext. freely. does not use assistive device for amb  NEURO:   Cranial nerves 2-12 are normal tested and grossly at patient's baseline, alert  PSYCH:  affect and mood normal, no apparent anxiety  Labs:     Most Recent 3 CBC's:  Recent Labs   Lab Test 10/01/19   WBC 9.1   HGB 14.4   MCV 92        Most Recent 3 BMP's:  Recent Labs   Lab Test 02/05/20 10/01/19    132*   POTASSIUM 3.9 3.8   CHLORIDE 100 94*   CO2 26 28   BUN 17 19   CR 1.27 1.21   ANIONGAP 10 10   ALOK 9.6 9.6   * 118     Most Recent Hemoglobin A1c:  Recent Labs   Lab Test 10/01/19   A1C 6.4*       ASSESSMENT/PLAN:  Essential hypertension  Sl. Elevated today. Gen. Stable. ooc HR in upper 50s  1. Cont. Atenolol  2. Cont. Lisinopril, hydrochlorothiazide  3. Cont. Atenolol hold parameters for HR<60, if held more than 1x, week over next couple weeks, may change hold  parameter to HR < 55  4. Bmp later this month    Frontal lobe dementia (H)  Memory loss. Exp aphasia.  Recent increased labile mood, aggressive behaviors. Both improved with start of depakote  1. Cont. Sched, prn seroquel  2. Cont. depakote  3. Cont. Zoloft, monitor for reports of increased anxiety    Type 2 diabetes mellitus with stage 3 chronic kidney disease, without long-term current use of insulin (H)  Last hgba1c stable. Recent increased seroquel dose  1. Bmp later this month  2. hgba1c later this month  3. If hgba1c > 7.0 consider starting oral DM med      Electronically signed by:  SAVANNAH Seymour CNP     Video-Visit Details  Type of service:  Video Visit  Video End Time (time video stopped): 0911  Distant Location (provider location):  [facility]

## 2020-04-10 NOTE — LETTER
"    4/10/2020        RE: Eduardo Cleveland [facility]  [address]  [facility] [address]        [place] [facility]   Eduardo Singh is being evaluated via a billable video visit due to the restrictions of the Covid-19 pandemic.   The patient has been notified of following:  \"This video visit will be conducted via a call between you and your provider. We have found that certain health care needs can be provided without the need for an in-person physical exam.  This service lets us provide the care you need with a video conversation. If during the course of the call the provider feels a video visit is not appropriate, you will not be charged for this service.\"   The provider has received verbal consent for a Video Visit from the patient or first contact? Yes  Patient  or facility staff would like the video invitation sent by: Text to cell phone: [phone]  Video Start Time: 0900    [place] Medical Record Number:  [id number]  Place of Location at the time of visit: [facility]   Chief Complaint   Patient presents with     Hypertension     Dementia     HPI:  Eduardo Singh  is a 74 year old (1945), who is being seen today for a visit.  HPI information obtained from: facility chart records, facility staff, patient report and [place] Epic chart review. Today's concern is: HTN, dementia, DMII.  For HTN cont. On atenolol, lisinopril-hydrochlorothiazide. BPs gen. Stable. Sl elevated today.  HRs gen stable in 60s. occ has HRs upper 50s.  Atenolol occ held per parameter of HR< 60.  Cont. On aricept for dementia. Also taking seroquel. depakote recently added due to increased anxiety, aggressive behaviors, labile mood.  Since this point, no recent reports of aggressive behaviors. Mood more stable per staff.  Gait steady. Has DMII diet controlled. 10/19 hgba1c 6.4%. last GLC level 127. Po intake stable.        Past Medical and Surgical History " reviewed in Epic today.  MEDICATIONS:    Current Outpatient Medications   Medication Sig Dispense Refill     divalproex sodium extended-release (DEPAKOTE ER) 250 MG 24 hr tablet Take 250 mg by mouth daily       QUEtiapine (SEROQUEL) 50 MG tablet Take 50 mg by mouth 3 times daily And 25 mg every day prn       atenolol (TENORMIN) 50 MG tablet Take 50 mg by mouth daily       docusate sodium (COLACE) 100 MG capsule Take 100 mg by mouth daily       donepezil (ARICEPT) 10 MG tablet Take 10 mg by mouth At Bedtime       lisinopril-hydrochlorothiazide (PRINZIDE/ZESTORETIC) 20-25 MG tablet Take 1 tablet by mouth daily       sertraline (ZOLOFT) 50 MG tablet Take 75 mg by mouth daily       simvastatin (ZOCOR) 20 MG tablet Take 20 mg by mouth At Bedtime       REVIEW OF SYSTEMS: Unobtainable secondary to cognitive impairment. Reports feeling ok today  Objective: BP (!) 155/84   Pulse 60   Resp 18   Limited visit exam done given COVID-19 precautions.   GENERAL APPEARANCE:  Alert, in no distress, cooperative  ENT:  [place], adequate dentition  EYES:  PERRL, no drainage  NECK:  FROM  RESP:  no respiratory distress, no apparent cough  CV:  peripheral edema trace+ in LEs  M/S:   Gait and station normal  moves all 4 ext. freely. does not use assistive device for amb  NEURO:   Cranial nerves 2-12 are normal tested and grossly at patient's baseline, alert  PSYCH:  affect and mood normal, no apparent anxiety  Labs:     Most Recent 3 CBC's:  Recent Labs   Lab Test 10/01/19   WBC 9.1   HGB 14.4   MCV 92        Most Recent 3 BMP's:  Recent Labs   Lab Test 02/05/20 10/01/19    132*   POTASSIUM 3.9 3.8   CHLORIDE 100 94*   CO2 26 28   BUN 17 19   CR 1.27 1.21   ANIONGAP 10 10   ALOK 9.6 9.6   * 118     Most Recent Hemoglobin A1c:  Recent Labs   Lab Test 10/01/19   A1C 6.4*       ASSESSMENT/PLAN:  Essential hypertension  Sl. Elevated today. Gen. Stable. ooc HR in upper 50s  1. Cont. Atenolol  2. Cont. Lisinopril,  hydrochlorothiazide  3. Cont. Atenolol hold parameters for HR<60, if held more than 1x, week over next couple weeks, may change hold parameter to HR < 55  4. Bmp later this month    Frontal lobe dementia (H)  Memory loss. Exp aphasia.  Recent increased labile mood, aggressive behaviors. Both improved with start of depakote  1. Cont. Sched, prn seroquel  2. Cont. depakote  3. Cont. Zoloft, monitor for reports of increased anxiety    Type 2 diabetes mellitus with stage 3 chronic kidney disease, without long-term current use of insulin (H)  Last hgba1c stable. Recent increased seroquel dose  1. Bmp later this month  2. hgba1c later this month  3. If hgba1c > 7.0 consider starting oral DM med      Electronically signed by:  SAVANNAH Seymour CNP     Video-Visit Details  Type of service:  Video Visit  Video End Time (time video stopped): 0911  Distant Location (provider location):  [place] [facility]           Sincerely,        SAVANNAH Seymour CNP

## 2020-04-28 ENCOUNTER — TRANSFERRED RECORDS (OUTPATIENT)
Dept: HEALTH INFORMATION MANAGEMENT | Facility: CLINIC | Age: 75
End: 2020-04-28

## 2020-04-28 ENCOUNTER — RECORDS - HEALTHEAST (OUTPATIENT)
Dept: LAB | Facility: CLINIC | Age: 75
End: 2020-04-28

## 2020-04-28 LAB
ANION GAP SERPL CALCULATED.3IONS-SCNC: 9 MMOL/L (ref 5–18)
ANION GAP SERPL CALCULATED.3IONS-SCNC: 9 MMOL/L (ref 5–18)
BUN SERPL-MCNC: 26 MG/DL (ref 8–28)
BUN SERPL-MCNC: 26 MG/DL (ref 8–28)
CALCIUM SERPL-MCNC: 9.1 MG/DL (ref 8.5–10.5)
CALCIUM SERPL-MCNC: 9.1 MG/DL (ref 8.5–10.5)
CHLORIDE BLD-SCNC: 100 MMOL/L (ref 98–107)
CHLORIDE SERPLBLD-SCNC: 100 MMOL/L (ref 98–107)
CO2 SERPL-SCNC: 28 MMOL/L (ref 22–31)
CO2 SERPL-SCNC: 28 MMOL/L (ref 22–31)
CREAT SERPL-MCNC: 1.2 MG/DL (ref 0.7–1.3)
CREAT SERPL-MCNC: 1.2 MG/DL (ref 0.7–1.3)
GFR SERPL CREATININE-BSD FRML MDRD: 59 ML/MIN/1.73M2
GFR SERPL CREATININE-BSD FRML MDRD: 59 ML/MIN/1.73M2
GLUCOSE BLD-MCNC: 122 MG/DL (ref 70–125)
GLUCOSE SERPL-MCNC: 122 MG/DL (ref 70–125)
HBA1C MFR BLD: 6.5 %
HBA1C MFR BLD: 6.5 % (ref 0–5.7)
POTASSIUM BLD-SCNC: 3.9 MMOL/L (ref 3.5–5)
POTASSIUM SERPL-SCNC: 3.9 MMOL/L (ref 3.5–5)
SODIUM SERPL-SCNC: 137 MMOL/L (ref 136–145)
SODIUM SERPL-SCNC: 137 MMOL/L (ref 136–145)

## 2020-06-29 ENCOUNTER — VIRTUAL VISIT (OUTPATIENT)
Dept: GERIATRICS | Facility: CLINIC | Age: 75
End: 2020-06-29
Payer: COMMERCIAL

## 2020-06-29 VITALS
DIASTOLIC BLOOD PRESSURE: 65 MMHG | OXYGEN SATURATION: 93 % | HEART RATE: 76 BPM | TEMPERATURE: 98.2 F | RESPIRATION RATE: 16 BRPM | SYSTOLIC BLOOD PRESSURE: 134 MMHG

## 2020-06-29 DIAGNOSIS — G31.09 FRONTAL LOBE DEMENTIA (H): ICD-10-CM

## 2020-06-29 DIAGNOSIS — F32.A DEPRESSION, UNSPECIFIED DEPRESSION TYPE: ICD-10-CM

## 2020-06-29 DIAGNOSIS — I10 ESSENTIAL HYPERTENSION: Primary | ICD-10-CM

## 2020-06-29 DIAGNOSIS — F02.80 FRONTAL LOBE DEMENTIA (H): ICD-10-CM

## 2020-06-29 NOTE — PROGRESS NOTES
"[place] [facility]   Eduardo Singh is being evaluated via a billable video visit due to the restrictions of the Covid-19 pandemic.   The patient has been notified of following:  \"This video visit will be conducted via a call between you and your provider. We have found that certain health care needs can be provided without the need for an in-person physical exam.  This service lets us provide the care you need with a video conversation. If during the course of the call the provider feels a video visit is not appropriate, you will not be charged for this service.\"   The provider has received verbal consent for a Video Visit from the patient or first contact? Yes  Patient  or facility staff would like the video invitation sent by: Text to cell phone: [phone]  Video Start Time: 1345    [place] Medical Record Number:  [id number]  Place of Location at the time of visit: [facility]   Chief Complaint   Patient presents with     Video Visit     Hypertension     Dementia     HPI:  Eduardo Singh  is a 74 year old (1945), who is being seen today for a visit.  HPI information obtained from: facility chart records, facility staff, patient report and [facility] chart review. Today's concern is: HTN, dementia, depression.  For HTN cont. On atenolol, lisinopril-hydrochlorothiazide. BPs, HR stable past couple mos. Per staff po intake stable.  No recent falls. For dementia cont. On aricept, seroquel, depakote.  Has had increased seroquel doses, depakote started due to aggressive behaviors.  Per staff, freq. And intensity of these behaviors sig. Improved.  Cont. On zoloft for depression.  Appears to get bored in afternoons-decreased structured activities due to covid 19. occ striking out behaviors towards staff. No reports of insomnia.       Past Medical and Surgical History reviewed in Epic today.  MEDICATIONS:    Current Outpatient Medications   Medication Sig Dispense " Refill     atenolol (TENORMIN) 50 MG tablet Take 50 mg by mouth daily       divalproex sodium extended-release (DEPAKOTE ER) 250 MG 24 hr tablet Take 250 mg by mouth daily       docusate sodium (COLACE) 100 MG capsule Take 100 mg by mouth daily       donepezil (ARICEPT) 10 MG tablet Take 10 mg by mouth At Bedtime       lisinopril-hydrochlorothiazide (PRINZIDE/ZESTORETIC) 20-25 MG tablet Take 1 tablet by mouth daily       QUEtiapine (SEROQUEL) 50 MG tablet Take 50 mg by mouth 3 times daily And 25 mg every day prn       sertraline (ZOLOFT) 50 MG tablet Take 75 mg by mouth daily       simvastatin (ZOCOR) 20 MG tablet Take 20 mg by mouth At Bedtime       REVIEW OF SYSTEMS: Unobtainable secondary to cognitive impairment. Denies current pain  Objective: /65   Pulse 76   Temp 98.2  F (36.8  C)   Resp 16   SpO2 93%   Limited visit exam done given COVID-19 precautions.   GENERAL APPEARANCE:  Alert, in no distress, cooperative  ENT:  Mouth and posterior oropharynx normal, moist mucous membranes, [place]  EYES:  EOM normal, Conjunctiva and lids normal  NECK:  FROM  RESP:  no respiratory distress  CV:  no edema, rate-normal  M/S:   Gait and station normal  muscle strength 5/5 all 4 ext., no increased rigidity  NEURO:   Cranial nerves 2-12 are normal tested and grossly at patient's baseline, alert, exp. aphasia  PSYCH:  insight and judgement impaired, memory impaired , affect and mood normal  Labs:     Most Recent 3 CBC's:  Recent Labs   Lab Test 10/01/19   WBC 9.1   HGB 14.4   MCV 92        Most Recent 3 BMP's:  Recent Labs   Lab Test 04/28/20 02/05/20 10/01/19    136 132*   POTASSIUM 3.9 3.9 3.8   CHLORIDE 100 100 94*   CO2 28 26 28   BUN 26 17 19   CR 1.20 1.27 1.21   ANIONGAP 9 10 10   AOLK 9.1 9.6 9.6    127* 118       ASSESSMENT/PLAN:  Essential hypertension  Currently stable  1. Cont. Atenolol  2. Cont. Lisinopril-hydrochlorothiazide  3. Follow BPs, HRs  4. Encourage fluids  5. Bmp in next 1-3  mos    Frontal lobe dementia (H)  Memory loss. Exp. Aphasia occ aggressive behaviors, gen. Improved with start of depakote, increased seroquel  1. Cont. Sched, prn seroquel  2. Cont. Depakote, aricept  3. Staff to utilized prn seroquel dose early in afternoon for next couple days  4. If above effective to decrease target behaviors, may increase sched. Noon seroquel dose    Depression, unspecified depression type  Labile mood at times, can become frustrated due to exp. Aphasia, decreased activities during the day  1. Cont. zoloft  2. Monitor for further increased labile mood  3. Reassess over next couple mos      Electronically signed by:  SAVANNAH Seymour CNP     Video-Visit Details  Type of service:  Video Visit  Video End Time (time video stopped): 1355  Distant Location (provider location):  [facility]

## 2020-06-29 NOTE — LETTER
"    6/29/2020        RE: Eduardo Singh  [address]  [place] [address]        [facility]   Eduardo Singh is being evaluated via a billable video visit due to the restrictions of the Covid-19 pandemic.   The patient has been notified of following:  \"This video visit will be conducted via a call between you and your provider. We have found that certain health care needs can be provided without the need for an in-person physical exam.  This service lets us provide the care you need with a video conversation. If during the course of the call the provider feels a video visit is not appropriate, you will not be charged for this service.\"   The provider has received verbal consent for a Video Visit from the patient or first contact? Yes  Patient  or facility staff would like the video invitation sent by: Text to cell phone: [phone]  Video Start Time: 1345    [facility] Medical Record Number:  [id number]  Place of Location at the time of visit: [facility]   Chief Complaint   Patient presents with     Video Visit     Hypertension     Dementia     HPI:  Eduardo Singh  is a 74 year old (1945), who is being seen today for a visit.  HPI information obtained from: facility chart records, facility staff, patient report and [facility] Epic chart review. Today's concern is: HTN, dementia, depression.  For HTN cont. On atenolol, lisinopril-hydrochlorothiazide. BPs, HR stable past couple mos. Per staff po intake stable.  No recent falls. For dementia cont. On aricept, seroquel, depakote.  Has had increased seroquel doses, depakote started due to aggressive behaviors.  Per staff, freq. And intensity of these behaviors sig. Improved.  Cont. On zoloft for depression.  Appears to get bored in afternoons-decreased structured activities due to covid 19. occ striking out behaviors towards staff. No reports of insomnia.       Past Medical and Surgical History reviewed in " Epic today.  MEDICATIONS:    Current Outpatient Medications   Medication Sig Dispense Refill     atenolol (TENORMIN) 50 MG tablet Take 50 mg by mouth daily       divalproex sodium extended-release (DEPAKOTE ER) 250 MG 24 hr tablet Take 250 mg by mouth daily       docusate sodium (COLACE) 100 MG capsule Take 100 mg by mouth daily       donepezil (ARICEPT) 10 MG tablet Take 10 mg by mouth At Bedtime       lisinopril-hydrochlorothiazide (PRINZIDE/ZESTORETIC) 20-25 MG tablet Take 1 tablet by mouth daily       QUEtiapine (SEROQUEL) 50 MG tablet Take 50 mg by mouth 3 times daily And 25 mg every day prn       sertraline (ZOLOFT) 50 MG tablet Take 75 mg by mouth daily       simvastatin (ZOCOR) 20 MG tablet Take 20 mg by mouth At Bedtime       REVIEW OF SYSTEMS: Unobtainable secondary to cognitive impairment. Denies current pain  Objective: /65   Pulse 76   Temp 98.2  F (36.8  C)   Resp 16   SpO2 93%   Limited visit exam done given COVID-19 precautions.   GENERAL APPEARANCE:  Alert, in no distress, cooperative  ENT:  Mouth and posterior oropharynx normal, moist mucous membranes, [place]  EYES:  EOM normal, Conjunctiva and lids normal  NECK:  FROM  RESP:  no respiratory distress  CV:  no edema, rate-normal  M/S:   Gait and station normal  muscle strength 5/5 all 4 ext., no increased rigidity  NEURO:   Cranial nerves 2-12 are normal tested and grossly at patient's baseline, alert, exp. aphasia  PSYCH:  insight and judgement impaired, memory impaired , affect and mood normal  Labs:     Most Recent 3 CBC's:  Recent Labs   Lab Test 10/01/19   WBC 9.1   HGB 14.4   MCV 92        Most Recent 3 BMP's:  Recent Labs   Lab Test 04/28/20 02/05/20 10/01/19    136 132*   POTASSIUM 3.9 3.9 3.8   CHLORIDE 100 100 94*   CO2 28 26 28   BUN 26 17 19   CR 1.20 1.27 1.21   ANIONGAP 9 10 10   ALOK 9.1 9.6 9.6    127* 118       ASSESSMENT/PLAN:  Essential hypertension  Currently stable  1. Cont. Atenolol  2. Cont.  Lisinopril-hydrochlorothiazide  3. Follow BPs, HRs  4. Encourage fluids  5. Bmp in next 1-3 mos    Frontal lobe dementia (H)  Memory loss. Exp. Aphasia occ aggressive behaviors, gen. Improved with start of depakote, increased seroquel  1. Cont. Sched, prn seroquel  2. Cont. Depakote, aricept  3. Staff to utilized prn seroquel dose early in afternoon for next couple days  4. If above effective to decrease target behaviors, may increase sched. Noon seroquel dose    Depression, unspecified depression type  Labile mood at times, can become frustrated due to exp. Aphasia, decreased activities during the day  1. Cont. zoloft  2. Monitor for further increased labile mood  3. Reassess over next couple mos      Electronically signed by:  SAVANNAH Seymour CNP     Video-Visit Details  Type of service:  Video Visit  Video End Time (time video stopped): 1355  Distant Location (provider location):  [facility]             Sincerely,        SAVANNAH Seymour CNP

## 2020-07-06 ENCOUNTER — VIRTUAL VISIT (OUTPATIENT)
Dept: GERIATRICS | Facility: CLINIC | Age: 75
End: 2020-07-06
Payer: COMMERCIAL

## 2020-07-06 VITALS — DIASTOLIC BLOOD PRESSURE: 75 MMHG | HEART RATE: 67 BPM | SYSTOLIC BLOOD PRESSURE: 132 MMHG | RESPIRATION RATE: 18 BRPM

## 2020-07-06 DIAGNOSIS — F32.A DEPRESSION, UNSPECIFIED DEPRESSION TYPE: ICD-10-CM

## 2020-07-06 DIAGNOSIS — F02.80 FRONTAL LOBE DEMENTIA (H): ICD-10-CM

## 2020-07-06 DIAGNOSIS — R47.01 EXPRESSIVE APHASIA: Primary | ICD-10-CM

## 2020-07-06 DIAGNOSIS — G31.09 FRONTAL LOBE DEMENTIA (H): ICD-10-CM

## 2020-07-06 RX ORDER — DIVALPROEX SODIUM 250 MG/1
250 TABLET, EXTENDED RELEASE ORAL 2 TIMES DAILY
COMMUNITY
End: 2020-09-09

## 2020-07-06 NOTE — PROGRESS NOTES
"[facility]   Eduardo Singh is being evaluated via a billable video visit due to the restrictions of the Covid-19 pandemic.   The patient has been notified of following:  \"This video visit will be conducted via a call between you and your provider. We have found that certain health care needs can be provided without the need for an in-person physical exam.  This service lets us provide the care you need with a video conversation. If during the course of the call the provider feels a video visit is not appropriate, you will not be charged for this service.\"   The provider has received verbal consent for a Video Visit from the patient or first contact? Yes  Patient  or facility staff would like the video invitation sent by: Text to cell phone: [phone]  Video Start Time: 0917    [facility] Medical Record Number:  [id number]  Place of Location at the time of visit: [facility]   Chief Complaint   Patient presents with     Aphasia     Dementia     Video Visit     HPI:  Eduardo Singh  is a 74 year old (1945), who is being seen today for a visit.  HPI information obtained from: facility chart records, facility staff, patient report and [facility] chart review. Today's concern is: expressive aphasia, dementia, depression. Has dementia with exp. Aphasia.  Per staff has had increased diff. Expressing self at times, worsening of exp. Aphasia.  Has had subsequent increased frustration, agitation at times.  Due to covid restrictions has less structured activity during the day. Mood more labile at times.  Cont. On zoloft for depression.  Po intake stable. No reports of insomnia. For dementia cont. On seroquel, depakote.  Has had ongoing increased aggressive behaviors at times-impulsive. Staff unable to identify triggering factors.  Prn seroquel ineffective.       Past Medical and Surgical History reviewed in Epic today.  MEDICATIONS:    Current Outpatient Medications "   Medication Sig Dispense Refill     divalproex sodium extended-release (DEPAKOTE ER) 250 MG 24 hr tablet Take 250 mg by mouth 2 times daily       atenolol (TENORMIN) 50 MG tablet Take 50 mg by mouth daily       docusate sodium (COLACE) 100 MG capsule Take 100 mg by mouth daily       donepezil (ARICEPT) 10 MG tablet Take 10 mg by mouth At Bedtime       lisinopril-hydrochlorothiazide (PRINZIDE/ZESTORETIC) 20-25 MG tablet Take 1 tablet by mouth daily       QUEtiapine (SEROQUEL) 50 MG tablet Take 50 mg by mouth 3 times daily And 25 mg every day prn       sertraline (ZOLOFT) 50 MG tablet Take 75 mg by mouth daily       simvastatin (ZOCOR) 20 MG tablet Take 20 mg by mouth At Bedtime       REVIEW OF SYSTEMS: Unobtainable secondary to cognitive impairment. Denies pain today  Objective: /75   Pulse 67   Resp 18   Limited visit exam done given COVID-19 precautions.   GENERAL APPEARANCE:  Alert, in no distress, cooperative  ENT:  Mouth and posterior oropharynx normal, moist mucous membranes, [place]  EYES:  EOM normal, Conjunctiva and lids normal  NECK:  FROM  RESP:  lungs clear to auscultation   CV:  no edema, rate-normal  M/S:   Gait and station normal  muscle strength appears 5/5 all 4 ext  NEURO:   Cranial nerves 2-12 are normal tested and grossly at patient's baseline, exp. aphasia  PSYCH:  insight and judgement impaired, memory impaired , affect and mood normal  Labs:     Most Recent 3 CBC's:  Recent Labs   Lab Test 10/01/19   WBC 9.1   HGB 14.4   MCV 92        Most Recent 3 BMP's:  Recent Labs   Lab Test 04/28/20 02/05/20 10/01/19    136 132*   POTASSIUM 3.9 3.9 3.8   CHLORIDE 100 100 94*   CO2 28 26 28   BUN 26 17 19   CR 1.20 1.27 1.21   ANIONGAP 9 10 10   ALOK 9.1 9.6 9.6    127* 118       ASSESSMENT/PLAN:  Expressive aphasia  Increased s/s per staff. Frustration a times  1. Cont. To re-approach, to assess when frustrated  2. Cont. To anticipate needs  3. Monitor for further increased  s/s    Frontal lobe dementia (H)  Memory loss, increased freq. Of aggressive behaviors.   1. Spoke with spouse-reports talking less with resident recently due to other family issue  2. Cont. Sched., prn seroquel, aricept  3. Increase depakote to 250 mg bid  4. Reassess behaviors over next week    Depression, unspecified depression type  Some ongoing s/s, decreased activities on unit, more labile mood at times. Po intake, sleep stable  1. Cont. zoloft  2. depakote increase as above  3. Cont. To engage in activities throughout day  4. For further increase s/s depression, may try alt. antidepressant        Electronically signed by:  SAVANNAH Seymour CNP     Video-Visit Details  Type of service:  Video Visit  Video End Time (time video stopped): 0925  Distant Location (provider location):  [facility]

## 2020-07-06 NOTE — LETTER
"    7/6/2020        RE: Eduardo Singh  [address]  [place] [address]        [facility]   Eduardo Singh is being evaluated via a billable video visit due to the restrictions of the Covid-19 pandemic.   The patient has been notified of following:  \"This video visit will be conducted via a call between you and your provider. We have found that certain health care needs can be provided without the need for an in-person physical exam.  This service lets us provide the care you need with a video conversation. If during the course of the call the provider feels a video visit is not appropriate, you will not be charged for this service.\"   The provider has received verbal consent for a Video Visit from the patient or first contact? Yes  Patient  or facility staff would like the video invitation sent by: Text to cell phone: [phone]  Video Start Time: 0917    [facility] Medical Record Number:  [id number]  Place of Location at the time of visit: [facility]   Chief Complaint   Patient presents with     Aphasia     Dementia     Video Visit     HPI:  Eduardo Singh  is a 74 year old (1945), who is being seen today for a visit.  HPI information obtained from: facility chart records, facility staff, patient report and [facility] chart review. Today's concern is: expressive aphasia, dementia, depression. Has dementia with exp. Aphasia.  Per staff has had increased diff. Expressing self at times, worsening of exp. Aphasia.  Has had subsequent increased frustration, agitation at times.  Due to covid restrictions has less structured activity during the day. Mood more labile at times.  Cont. On zoloft for depression.  Po intake stable. No reports of insomnia. For dementia cont. On seroquel, depakote.  Has had ongoing increased aggressive behaviors at times-impulsive. Staff unable to identify triggering factors.  Prn seroquel ineffective.       Past Medical and " Surgical History reviewed in Epic today.  MEDICATIONS:    Current Outpatient Medications   Medication Sig Dispense Refill     divalproex sodium extended-release (DEPAKOTE ER) 250 MG 24 hr tablet Take 250 mg by mouth 2 times daily       atenolol (TENORMIN) 50 MG tablet Take 50 mg by mouth daily       docusate sodium (COLACE) 100 MG capsule Take 100 mg by mouth daily       donepezil (ARICEPT) 10 MG tablet Take 10 mg by mouth At Bedtime       lisinopril-hydrochlorothiazide (PRINZIDE/ZESTORETIC) 20-25 MG tablet Take 1 tablet by mouth daily       QUEtiapine (SEROQUEL) 50 MG tablet Take 50 mg by mouth 3 times daily And 25 mg every day prn       sertraline (ZOLOFT) 50 MG tablet Take 75 mg by mouth daily       simvastatin (ZOCOR) 20 MG tablet Take 20 mg by mouth At Bedtime       REVIEW OF SYSTEMS: Unobtainable secondary to cognitive impairment. Denies pain today  Objective: /75   Pulse 67   Resp 18   Limited visit exam done given COVID-19 precautions.   GENERAL APPEARANCE:  Alert, in no distress, cooperative  ENT:  Mouth and posterior oropharynx normal, moist mucous membranes, [place]  EYES:  EOM normal, Conjunctiva and lids normal  NECK:  FROM  RESP:  lungs clear to auscultation   CV:  no edema, rate-normal  M/S:   Gait and station normal  muscle strength appears 5/5 all 4 ext  NEURO:   Cranial nerves 2-12 are normal tested and grossly at patient's baseline, exp. aphasia  PSYCH:  insight and judgement impaired, memory impaired , affect and mood normal  Labs:     Most Recent 3 CBC's:  Recent Labs   Lab Test 10/01/19   WBC 9.1   HGB 14.4   MCV 92        Most Recent 3 BMP's:  Recent Labs   Lab Test 04/28/20 02/05/20 10/01/19    136 132*   POTASSIUM 3.9 3.9 3.8   CHLORIDE 100 100 94*   CO2 28 26 28   BUN 26 17 19   CR 1.20 1.27 1.21   ANIONGAP 9 10 10   ALOK 9.1 9.6 9.6    127* 118       ASSESSMENT/PLAN:  Expressive aphasia  Increased s/s per staff. Frustration a times  1. Cont. To re-approach, to assess  when frustrated  2. Cont. To anticipate needs  3. Monitor for further increased s/s    Frontal lobe dementia (H)  Memory loss, increased freq. Of aggressive behaviors.   1. Spoke with spouse-reports talking less with resident recently due to other family issue  2. Cont. Sched., prn seroquel, aricept  3. Increase depakote to 250 mg bid  4. Reassess behaviors over next week    Depression, unspecified depression type  Some ongoing s/s, decreased activities on unit, more labile mood at times. Po intake, sleep stable  1. Cont. zoloft  2. depakote increase as above  3. Cont. To engage in activities throughout day  4. For further increase s/s depression, may try alt. antidepressant        Electronically signed by:  SAVANNAH Seymour CNP     Video-Visit Details  Type of service:  Video Visit  Video End Time (time video stopped): 0925  Distant Location (provider location):  [facility]             Sincerely,        SAVANNAH Seymour CNP

## 2020-07-13 ENCOUNTER — VIRTUAL VISIT (OUTPATIENT)
Dept: GERIATRICS | Facility: CLINIC | Age: 75
End: 2020-07-13
Payer: COMMERCIAL

## 2020-07-13 DIAGNOSIS — F02.80 FRONTAL LOBE DEMENTIA (H): ICD-10-CM

## 2020-07-13 DIAGNOSIS — F41.1 GENERALIZED ANXIETY DISORDER: Primary | ICD-10-CM

## 2020-07-13 DIAGNOSIS — I10 ESSENTIAL HYPERTENSION: ICD-10-CM

## 2020-07-13 DIAGNOSIS — G31.09 FRONTAL LOBE DEMENTIA (H): ICD-10-CM

## 2020-07-13 NOTE — PROGRESS NOTES
"[facility]   Eduardo Singh is being evaluated via a billable video visit due to the restrictions of the Covid-19 pandemic.   The patient has been notified of following:  \"This video visit will be conducted via a call between you and your provider. We have found that certain health care needs can be provided without the need for an in-person physical exam.  This service lets us provide the care you need with a video conversation. If during the course of the call the provider feels a video visit is not appropriate, you will not be charged for this service.\"   The provider has received verbal consent for a Video Visit from the patient or first contact? Yes  Patient  or facility staff would like the video invitation sent by: Text to cell phone: [phone]  Video Start Time: 0913    [facility] Medical Record Number:  [id number]  [facility] of [facility] at the time of visit: [facility]   Chief Complaint   Patient presents with     Video Visit     Anxiety     HPI:  Eduardo Singh  is a 74 year old (1945), who is being seen today for a visit.  HPI information obtained from: facility chart records, facility staff, patient report and [facility] chart review. Today's concern is: anxiety, dementia, HTN.  Has had increased anxiety at times, increased labile mood at times.  Had depakote increase on 7/6/20.  Since this time has had increased anxiety at times, however staff reports decrease in aggressive behaviors such as striking out.  Has memory loss due to dementia, exp. Aphasia.  Becomes frustrated when staff can not understand him.  Does not always respond to staff redirection.  Prn seroquel gen. Effective.  Also has sched. Seroquel. For HTN cont. On atenolol, lisinopril. Recent elevated BPs, more stable today.       Past Medical and Surgical History reviewed in Epic today.  MEDICATIONS:    Current Outpatient Medications   Medication Sig Dispense Refill     QUEtiapine " (SEROQUEL) 50 MG tablet Take 50 mg by mouth 3 times daily And 25 mg bid prn       atenolol (TENORMIN) 50 MG tablet Take 50 mg by mouth daily       divalproex sodium extended-release (DEPAKOTE ER) 250 MG 24 hr tablet Take 250 mg by mouth 2 times daily       docusate sodium (COLACE) 100 MG capsule Take 100 mg by mouth daily       donepezil (ARICEPT) 10 MG tablet Take 10 mg by mouth At Bedtime       lisinopril-hydrochlorothiazide (PRINZIDE/ZESTORETIC) 20-25 MG tablet Take 1 tablet by mouth daily       sertraline (ZOLOFT) 50 MG tablet Take 75 mg by mouth daily       simvastatin (ZOCOR) 20 MG tablet Take 20 mg by mouth At Bedtime       REVIEW OF SYSTEMS: Unobtainable secondary to cognitive impairment. Reports feeling ok today  Objective: /69   Pulse 100   Temp 97.8  F (36.6  C)   Resp 16   SpO2 93%   Limited visit exam done given COVID-19 precautions.   GENERAL APPEARANCE:  Alert, cooperative, occ anxious  ENT:  Mouth and posterior oropharynx normal, moist mucous membranes, [place]  EYES:  EOM normal, Conjunctiva and lids normal  NECK:  FROM  RESP:  lungs clear to auscultation , no respiratory distress  CV:  no edema, tachycardic   M/S:   Gait and station normal  muscle strength appears 5/5 all 4 ext.  NEURO:   Cranial nerves 2-12 are normal tested and grossly at patient's baseline, exp. aphasia  PSYCH:  insight and judgement impaired, memory impaired , affect abnormal -flat  Labs:     Most Recent 3 CBC's:  Recent Labs   Lab Test 10/01/19   WBC 9.1   HGB 14.4   MCV 92        Most Recent 3 BMP's:  Recent Labs   Lab Test 04/28/20 02/05/20 10/01/19    136 132*   POTASSIUM 3.9 3.9 3.8   CHLORIDE 100 100 94*   CO2 28 26 28   BUN 26 17 19   CR 1.20 1.27 1.21   ANIONGAP 9 10 10   ALOK 9.1 9.6 9.6    127* 118     Most Recent Hemoglobin A1c:  Recent Labs   Lab Test 04/28/20   A1C 6.5*       ASSESSMENT/PLAN:  Generalized anxiety disorder  Ongoing increased anxiety at times  1. Cont. Increased  depakote dose  2. Cont. zoloft  3. Reassess over next few weeks, for ongoing anxiety, may try alt. antidepressant    Frontal lobe dementia (H)  Memory loss. Exp. Aphasia. Decreased aggressive behaivors  1. Cont. aricept  2. Cont. Sched. seroquel  3. Increase prn seroquel to bid  4. Reassess mood, behaviors over next couple weeks    Essential hypertension  BPs stable today. Sl tachycardia  1. Cont. Atenolol, lisinopril  2. Follow BPs, HRs  3. Cbc, bmp next week  4. For ongoing tachycardia, consider increase atenolol dose        Electronically signed by:  SAVANNAH Seymour CNP     Video-Visit Details  Type of service:  Video Visit  Video End Time (time video stopped): 0921  Distant Location (provider location):  [facility]

## 2020-07-13 NOTE — LETTER
"    7/13/2020        RE: Eduardo Singh  [address]  [place] [address]        [facility]   Eduardo Singh is being evaluated via a billable video visit due to the restrictions of the Covid-19 pandemic.   The patient has been notified of following:  \"This video visit will be conducted via a call between you and your provider. We have found that certain health care needs can be provided without the need for an in-person physical exam.  This service lets us provide the care you need with a video conversation. If during the course of the call the provider feels a video visit is not appropriate, you will not be charged for this service.\"   The provider has received verbal consent for a Video Visit from the patient or first contact? Yes  Patient  or facility staff would like the video invitation sent by: Text to cell phone: [phone]  Video Start Time: 0913    [facility] Medical Record Number:  [id number]  Place of Location at the time of visit: [facility]   Chief Complaint   Patient presents with     Video Visit     Anxiety     HPI:  Eduardo Singh  is a 74 year old (1945), who is being seen today for a visit.  HPI information obtained from: facility chart records, facility staff, patient report and [facility] chart review. Today's concern is: anxiety, dementia, HTN.  Has had increased anxiety at times, increased labile mood at times.  Had depakote increase on 7/6/20.  Since this time has had increased anxiety at times, however staff reports decrease in aggressive behaviors such as striking out.  Has memory loss due to dementia, exp. Aphasia.  Becomes frustrated when staff can not understand him.  Does not always respond to staff redirection.  Prn seroquel gen. Effective.  Also has sched. Seroquel. For HTN cont. On atenolol, lisinopril. Recent elevated BPs, more stable today.       Past Medical and Surgical History reviewed in Epic " today.  MEDICATIONS:    Current Outpatient Medications   Medication Sig Dispense Refill     QUEtiapine (SEROQUEL) 50 MG tablet Take 50 mg by mouth 3 times daily And 25 mg bid prn       atenolol (TENORMIN) 50 MG tablet Take 50 mg by mouth daily       divalproex sodium extended-release (DEPAKOTE ER) 250 MG 24 hr tablet Take 250 mg by mouth 2 times daily       docusate sodium (COLACE) 100 MG capsule Take 100 mg by mouth daily       donepezil (ARICEPT) 10 MG tablet Take 10 mg by mouth At Bedtime       lisinopril-hydrochlorothiazide (PRINZIDE/ZESTORETIC) 20-25 MG tablet Take 1 tablet by mouth daily       sertraline (ZOLOFT) 50 MG tablet Take 75 mg by mouth daily       simvastatin (ZOCOR) 20 MG tablet Take 20 mg by mouth At Bedtime       REVIEW OF SYSTEMS: Unobtainable secondary to cognitive impairment. Reports feeling ok today  Objective: /69   Pulse 100   Temp 97.8  F (36.6  C)   Resp 16   SpO2 93%   Limited visit exam done given COVID-19 precautions.   GENERAL APPEARANCE:  Alert, cooperative, occ anxious  ENT:  Mouth and posterior oropharynx normal, moist mucous membranes, [place]  EYES:  EOM normal, Conjunctiva and lids normal  NECK:  FROM  RESP:  lungs clear to auscultation , no respiratory distress  CV:  no edema, tachycardic   M/S:   Gait and station normal  muscle strength appears 5/5 all 4 ext.  NEURO:   Cranial nerves 2-12 are normal tested and grossly at patient's baseline, exp. aphasia  PSYCH:  insight and judgement impaired, memory impaired , affect abnormal -flat  Labs:     Most Recent 3 CBC's:  Recent Labs   Lab Test 10/01/19   WBC 9.1   HGB 14.4   MCV 92        Most Recent 3 BMP's:  Recent Labs   Lab Test 04/28/20 02/05/20 10/01/19    136 132*   POTASSIUM 3.9 3.9 3.8   CHLORIDE 100 100 94*   CO2 28 26 28   BUN 26 17 19   CR 1.20 1.27 1.21   ANIONGAP 9 10 10   ALOK 9.1 9.6 9.6    127* 118     Most Recent Hemoglobin A1c:  Recent Labs   Lab Test 04/28/20   A1C 6.5*        ASSESSMENT/PLAN:  Generalized anxiety disorder  Ongoing increased anxiety at times  1. Cont. Increased depakote dose  2. Cont. zoloft  3. Reassess over next few weeks, for ongoing anxiety, may try alt. antidepressant    Frontal lobe dementia (H)  Memory loss. Exp. Aphasia. Decreased aggressive behaivors  1. Cont. aricept  2. Cont. Sched. seroquel  3. Increase prn seroquel to bid  4. Reassess mood, behaviors over next couple weeks    Essential hypertension  BPs stable today. Sl tachycardia  1. Cont. Atenolol, lisinopril  2. Follow BPs, HRs  3. Cbc, bmp next week  4. For ongoing tachycardia, consider increase atenolol dose        Electronically signed by:  SAVANNAH Seymour CNP     Video-Visit Details  Type of service:  Video Visit  Video End Time (time video stopped): 0921  Distant Location (provider location):  [facility]           Sincerely,        SAVANNAH Seymour CNP

## 2020-07-14 VITALS
TEMPERATURE: 97.8 F | OXYGEN SATURATION: 93 % | DIASTOLIC BLOOD PRESSURE: 69 MMHG | RESPIRATION RATE: 16 BRPM | HEART RATE: 100 BPM | SYSTOLIC BLOOD PRESSURE: 107 MMHG

## 2020-07-14 RX ORDER — QUETIAPINE FUMARATE 50 MG/1
50 TABLET, FILM COATED ORAL 3 TIMES DAILY
COMMUNITY
End: 2020-08-17

## 2020-08-09 ENCOUNTER — PATIENT OUTREACH (OUTPATIENT)
Dept: GERIATRIC MEDICINE | Facility: CLINIC | Age: 75
End: 2020-08-09

## 2020-08-09 NOTE — PROGRESS NOTES
Oswaldo Chatman [employer] Chart review      chart   No triggering events at this time   CM will follow-up as needed     Nivia Tavera MA [facility] Care Coordinator   [phone] - zuag cell phone   [phone] - work fax

## 2020-08-16 ENCOUNTER — HOSPITAL ENCOUNTER (EMERGENCY)
Facility: CLINIC | Age: 75
Discharge: HOME | End: 2020-08-16
Attending: EMERGENCY MEDICINE | Admitting: EMERGENCY MEDICINE
Payer: COMMERCIAL

## 2020-08-16 VITALS
DIASTOLIC BLOOD PRESSURE: 60 MMHG | TEMPERATURE: 98.3 F | SYSTOLIC BLOOD PRESSURE: 103 MMHG | RESPIRATION RATE: 16 BRPM | HEART RATE: 50 BPM | OXYGEN SATURATION: 98 %

## 2020-08-16 DIAGNOSIS — F03.91 DEMENTIA WITH BEHAVIORAL DISTURBANCE, UNSPECIFIED DEMENTIA TYPE: ICD-10-CM

## 2020-08-16 DIAGNOSIS — R45.1 AGITATION: ICD-10-CM

## 2020-08-16 LAB — GLUCOSE BLDC GLUCOMTR-MCNC: 122 MG/DL (ref 70–99)

## 2020-08-16 PROCEDURE — 96372 THER/PROPH/DIAG INJ SC/IM: CPT

## 2020-08-16 PROCEDURE — 25000128 H RX IP 250 OP 636

## 2020-08-16 PROCEDURE — 00000146 ZZHCL STATISTIC GLUCOSE BY METER IP

## 2020-08-16 PROCEDURE — 25000128 H RX IP 250 OP 636: Performed by: EMERGENCY MEDICINE

## 2020-08-16 PROCEDURE — 90791 PSYCH DIAGNOSTIC EVALUATION: CPT

## 2020-08-16 PROCEDURE — 99285 EMERGENCY DEPT VISIT HI MDM: CPT | Mod: 25

## 2020-08-16 PROCEDURE — 93005 ELECTROCARDIOGRAM TRACING: CPT

## 2020-08-16 RX ORDER — HALOPERIDOL 5 MG/ML
5 INJECTION INTRAMUSCULAR ONCE
Status: COMPLETED | OUTPATIENT
Start: 2020-08-16 | End: 2020-08-16

## 2020-08-16 RX ORDER — OLANZAPINE 10 MG/2ML
INJECTION, POWDER, FOR SOLUTION INTRAMUSCULAR
Status: COMPLETED
Start: 2020-08-16 | End: 2020-08-16

## 2020-08-16 RX ADMIN — HALOPERIDOL LACTATE 5 MG: 5 INJECTION, SOLUTION INTRAMUSCULAR at 11:40

## 2020-08-16 RX ADMIN — OLANZAPINE: 10 INJECTION, POWDER, FOR SOLUTION INTRAMUSCULAR at 10:00

## 2020-08-16 ASSESSMENT — ENCOUNTER SYMPTOMS: AGITATION: 1

## 2020-08-16 NOTE — ED AVS SNAPSHOT
[facility] Emergency Department  [address]  [facility] [address]  Phone:  [phone]  Fax:  [phone]                                    Eduardo Singh   MRN: [id number]    Department:  [facility] Emergency Department   Date of Visit:  8/16/2020           After Visit Summary Signature Page    I have received my discharge instructions, and my questions have been answered. I have discussed any challenges I see with this plan with the nurse or doctor.    ..........................................................................................................................................  Patient/Patient Representative Signature      ..........................................................................................................................................  Patient Representative Print Name and Relationship to Patient    ..................................................               ................................................  Date                                   Time    ..........................................................................................................................................  Reviewed by Signature/Title    ...................................................              ..............................................  Date                                               Time          22EPIC Rev 08/18

## 2020-08-16 NOTE — ED NOTES
Wife called writer and I updated her on why the patient was brought here and how he was doing. She reported that he is now in a home because he was becoming more agitated towards her and it became unsafe. Informed her that I will keep her update on his condition.
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
## 2020-08-16 NOTE — ED NOTES
DEC spoke with patient's clinical staff from dementia/memory loss residence, and all in agreement for a return home once patient is settled. He will need to be transported via ambulance as this facility cannot send someone to pick him up.KVNG

## 2020-08-16 NOTE — ED NOTES
Code 21 called after patient attempted to leave room and security tried to redirect him and escort him back. Security reports the patient attempted to run out of the unit when he was brought to the floor, then staff proceeded to place him back in the bed and in soft restraints. He was given 5 mg IM Zyprexa.
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
## 2020-08-16 NOTE — ED NOTES
Bed: ED06  Expected date: 8/16/20  Expected time: 9:13 AM  Means of arrival: Ambulance  Comments:  BV3

## 2020-08-17 ENCOUNTER — VIRTUAL VISIT (OUTPATIENT)
Dept: GERIATRICS | Facility: CLINIC | Age: 75
End: 2020-08-17
Payer: COMMERCIAL

## 2020-08-17 VITALS — RESPIRATION RATE: 18 BRPM

## 2020-08-17 DIAGNOSIS — F41.1 GENERALIZED ANXIETY DISORDER: Primary | ICD-10-CM

## 2020-08-17 DIAGNOSIS — F02.80 FRONTAL LOBE DEMENTIA (H): ICD-10-CM

## 2020-08-17 DIAGNOSIS — G31.09 FRONTAL LOBE DEMENTIA (H): ICD-10-CM

## 2020-08-17 DIAGNOSIS — I10 ESSENTIAL HYPERTENSION: ICD-10-CM

## 2020-08-17 PROBLEM — E11.9 TYPE 2 DIABETES MELLITUS WITHOUT COMPLICATION (H): Status: ACTIVE | Noted: 2019-02-11

## 2020-08-17 LAB — INTERPRETATION ECG - MUSE: NORMAL

## 2020-08-17 RX ORDER — QUETIAPINE FUMARATE 50 MG/1
50 TABLET, FILM COATED ORAL 2 TIMES DAILY
COMMUNITY
End: 2020-10-19
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
## 2020-09-09 ENCOUNTER — ASSISTED LIVING VISIT (OUTPATIENT)
Dept: GERIATRICS | Facility: CLINIC | Age: 75
End: 2020-09-09
Payer: COMMERCIAL

## 2020-09-09 VITALS
TEMPERATURE: 98.3 F | HEART RATE: 60 BPM | OXYGEN SATURATION: 92 % | DIASTOLIC BLOOD PRESSURE: 84 MMHG | RESPIRATION RATE: 16 BRPM | SYSTOLIC BLOOD PRESSURE: 138 MMHG

## 2020-09-09 DIAGNOSIS — G31.09 FRONTAL LOBE DEMENTIA (H): ICD-10-CM

## 2020-09-09 DIAGNOSIS — I10 ESSENTIAL HYPERTENSION: ICD-10-CM

## 2020-09-09 DIAGNOSIS — F02.80 FRONTAL LOBE DEMENTIA (H): ICD-10-CM

## 2020-09-09 DIAGNOSIS — M62.81 GENERALIZED MUSCLE WEAKNESS: Primary | ICD-10-CM

## 2020-09-09 RX ORDER — DIVALPROEX SODIUM 125 MG/1
250 TABLET, DELAYED RELEASE ORAL EVERY MORNING
COMMUNITY
End: 2020-01-01

## 2020-09-09 NOTE — LETTER
9/9/2020        RE: Edurado Singh  [address] [address]        [place] [facility]  [facility] Medical Record Number: [id number]  Place of Service where encounter took place: [facility] [[id number]]  Chief Complaint   Patient presents with     Fatigue       HPI:    Eduardo Singh is a 74 year old (1945), who is being seen today for an episodic care visit. HPI information obtained from: facility chart records, facility staff and [facility] chart review. Today's concern is: weakness, dementia, HTN.  Per staff had allergy s/s this am, irritated eyes, rhinitis.  This am appears more tired than usual, sl unsteady gait at times.  Afebrile, no resp. Distress.  Has had some bradycardia past 2 days with HRs in 50s-atenolol held per parameter.  HR, BP stable today. Has had increased doses of depakote ans seroquel over past few mos due to aggressive behaviors.       Past Medical and Surgical History reviewed in Epic today.    MEDICATIONS:    Current Outpatient Medications   Medication Sig Dispense Refill     divalproex sodium delayed-release (DEPAKOTE) 125 MG DR tablet Take 250 mg by mouth every morning And 125 mg bid       atenolol (TENORMIN) 50 MG tablet Take 50 mg by mouth daily       docusate sodium (COLACE) 100 MG capsule Take 100 mg by mouth daily       donepezil (ARICEPT) 10 MG tablet Take 10 mg by mouth At Bedtime       lisinopril-hydrochlorothiazide (PRINZIDE/ZESTORETIC) 20-25 MG tablet Take 1 tablet by mouth daily       QUEtiapine (SEROQUEL) 50 MG tablet Take 50 mg by mouth 2 times daily and 75 mg at bedtime, 25 mg bid prn       sertraline (ZOLOFT) 50 MG tablet Take 75 mg by mouth daily       simvastatin (ZOCOR) 20 MG tablet Take 20 mg by mouth At Bedtime       REVIEW OF SYSTEMS:  Unobtainable secondary to cognitive impairment.     Objective:  /84   Pulse 60   Temp 98.3  F (36.8  C)   Resp 16   SpO2 92%   Exam:  GENERAL APPEARANCE:  Alert, in  no distress, cooperative  ENT:  Mouth and posterior oropharynx normal, moist mucous membranes, [place], no rhinitis  EYES:  EOM, conjunctivae, lids, pupils and irises normal, PERRL  NECK:  No adenopathy,masses or thyromegaly, FROM  RESP:  respiratory effort and palpation of chest normal, lungs clear to auscultation , no respiratory distress  CV:  Palpation and auscultation of heart done , regular rate and rhythm, no murmur, rub, or gallop, no edema  ABDOMEN:  normal bowel sounds, soft, nontender, no hepatosplenomegaly or other masses, no guarding or rebound  M/S:   muscle strength 5/5 all 4 ext. gait sl. slowed from baseline, currently steady  NEURO:   Cranial nerves 2-12 are normal tested and grossly at patient's baseline  PSYCH:  affect abnormal -flat    Labs:     Most Recent 3 CBC's:  Recent Labs   Lab Test 10/01/19   WBC 9.1   HGB 14.4   MCV 92        Most Recent 3 BMP's:  Recent Labs   Lab Test 04/28/20 02/05/20 10/01/19    136 132*   POTASSIUM 3.9 3.9 3.8   CHLORIDE 100 100 94*   CO2 28 26 28   BUN 26 17 19   CR 1.20 1.27 1.21   ANIONGAP 9 10 10   ALOK 9.1 9.6 9.6    127* 118       ASSESSMENT/PLAN:  Generalized muscle weakness  Unclear etiology. Some allergy s/s this am.  Has occ insomnia.  1. Check depakote level tomorrow  2. Monitor for fever  3. Follow O2 sats  4. Monitor for further changes in gait    Frontal lobe dementia (H)  Memory loss, easily frustrated at times  1. Cont. Depakote, seroquel  2. Cont. zoloft  3. Monitor for reports of aggressive behaviors    Essential hypertension  BPs gen. Stable. occ bradycardia  1. Cont. zestoretic, atenolol  2. Cont. Every day HR checks  3. For further increased bradycardia, consider decreased aricept dose  4. Cbc, bmp tomorrow      Electronically signed by:  SAVANNAH Seymour CNP         Sincerely,        SAVANNAH Seymour CNP

## 2020-09-09 NOTE — PROGRESS NOTES
[place] [facility]  [place] Medical Record Number: [id number]  Place of Service where encounter took place: [facility] [[id number]]  Chief Complaint   Patient presents with     Fatigue       HPI:    Eduardo Singh is a 74 year old (1945), who is being seen today for an episodic care visit. HPI information obtained from: facility chart records, facility staff and [facility] chart review. Today's concern is: weakness, dementia, HTN.  Per staff had allergy s/s this am, irritated eyes, rhinitis.  This am appears more tired than usual, sl unsteady gait at times.  Afebrile, no resp. Distress.  Has had some bradycardia past 2 days with HRs in 50s-atenolol held per parameter.  HR, BP stable today. Has had increased doses of depakote ans seroquel over past few mos due to aggressive behaviors.       Past Medical and Surgical History reviewed in Epic today.    MEDICATIONS:    Current Outpatient Medications   Medication Sig Dispense Refill     divalproex sodium delayed-release (DEPAKOTE) 125 MG DR tablet Take 250 mg by mouth every morning And 125 mg bid       atenolol (TENORMIN) 50 MG tablet Take 50 mg by mouth daily       docusate sodium (COLACE) 100 MG capsule Take 100 mg by mouth daily       donepezil (ARICEPT) 10 MG tablet Take 10 mg by mouth At Bedtime       lisinopril-hydrochlorothiazide (PRINZIDE/ZESTORETIC) 20-25 MG tablet Take 1 tablet by mouth daily       QUEtiapine (SEROQUEL) 50 MG tablet Take 50 mg by mouth 2 times daily and 75 mg at bedtime, 25 mg bid prn       sertraline (ZOLOFT) 50 MG tablet Take 75 mg by mouth daily       simvastatin (ZOCOR) 20 MG tablet Take 20 mg by mouth At Bedtime       REVIEW OF SYSTEMS:  Unobtainable secondary to cognitive impairment.     Objective:  /84   Pulse 60   Temp 98.3  F (36.8  C)   Resp 16   SpO2 92%   Exam:  GENERAL APPEARANCE:  Alert, in no distress, cooperative  ENT:  Mouth and posterior oropharynx normal, moist mucous  membranes, [place], no rhinitis  EYES:  EOM, conjunctivae, lids, pupils and irises normal, PERRL  NECK:  No adenopathy,masses or thyromegaly, FROM  RESP:  respiratory effort and palpation of chest normal, lungs clear to auscultation , no respiratory distress  CV:  Palpation and auscultation of heart done , regular rate and rhythm, no murmur, rub, or gallop, no edema  ABDOMEN:  normal bowel sounds, soft, nontender, no hepatosplenomegaly or other masses, no guarding or rebound  M/S:   muscle strength 5/5 all 4 ext. gait sl. slowed from baseline, currently steady  NEURO:   Cranial nerves 2-12 are normal tested and grossly at patient's baseline  PSYCH:  affect abnormal -flat    Labs:     Most Recent 3 CBC's:  Recent Labs   Lab Test 10/01/19   WBC 9.1   HGB 14.4   MCV 92        Most Recent 3 BMP's:  Recent Labs   Lab Test 04/28/20 02/05/20 10/01/19    136 132*   POTASSIUM 3.9 3.9 3.8   CHLORIDE 100 100 94*   CO2 28 26 28   BUN 26 17 19   CR 1.20 1.27 1.21   ANIONGAP 9 10 10   ALOK 9.1 9.6 9.6    127* 118       ASSESSMENT/PLAN:  Generalized muscle weakness  Unclear etiology. Some allergy s/s this am.  Has occ insomnia.  1. Check depakote level tomorrow  2. Monitor for fever  3. Follow O2 sats  4. Monitor for further changes in gait    Frontal lobe dementia (H)  Memory loss, easily frustrated at times  1. Cont. Depakote, seroquel  2. Cont. zoloft  3. Monitor for reports of aggressive behaviors    Essential hypertension  BPs gen. Stable. occ bradycardia  1. Cont. zestoretic, atenolol  2. Cont. Every day HR checks  3. For further increased bradycardia, consider decreased aricept dose  4. Cbc, bmp tomorrow      Electronically signed by:  SAVANNAH Seymour CNP

## 2020-10-19 ENCOUNTER — VIRTUAL VISIT (OUTPATIENT)
Dept: GERIATRICS | Facility: CLINIC | Age: 75
End: 2020-10-19
Payer: COMMERCIAL

## 2020-10-19 VITALS
OXYGEN SATURATION: 93 % | DIASTOLIC BLOOD PRESSURE: 88 MMHG | HEART RATE: 61 BPM | SYSTOLIC BLOOD PRESSURE: 127 MMHG | RESPIRATION RATE: 16 BRPM | TEMPERATURE: 98.2 F

## 2020-10-19 DIAGNOSIS — M54.50 ACUTE MIDLINE LOW BACK PAIN WITHOUT SCIATICA: Primary | ICD-10-CM

## 2020-10-19 DIAGNOSIS — F02.80 FRONTAL LOBE DEMENTIA (H): ICD-10-CM

## 2020-10-19 DIAGNOSIS — M25.662 JOINT STIFFNESS OF LEFT LOWER LEG: ICD-10-CM

## 2020-10-19 DIAGNOSIS — G31.09 FRONTAL LOBE DEMENTIA (H): ICD-10-CM

## 2020-10-19 RX ORDER — QUETIAPINE FUMARATE 50 MG/1
50 TABLET, FILM COATED ORAL 2 TIMES DAILY
COMMUNITY
End: 2020-10-26

## 2020-10-19 RX ORDER — ACETAMINOPHEN 500 MG
1000 TABLET ORAL 2 TIMES DAILY
COMMUNITY

## 2020-10-19 RX ORDER — TROLAMINE SALICYLATE 10 G/100G
1 CREAM TOPICAL 2 TIMES DAILY
COMMUNITY

## 2020-10-19 NOTE — LETTER
"    10/19/2020        RE: Eduardo Singh  [address] [address]        [place] [facility]   Eduardo Singh is being evaluated via a billable video visit.   The patient has been notified of following:  \"This video visit will be conducted via a call between you and your provider. We have found that certain health care needs can be provided without the need for an in-person physical exam.  This service lets us provide the care you need with a video conversation. If during the course of the call the provider feels a video visit is not appropriate, you will not be charged for this service.\"   The provider has received verbal consent for a Video Visit from the patient or first contact? Yes  Patient  or facility staff would like the video invitation sent by: Text to cell phone: [phone]  Video Start Time: 1016  [place] Medical Record Number: [id number]  Place of Location at the time of visit: [facility]   Chief Complaint   Patient presents with     Video Visit     Pain     HPI: Eduardo Singh is a 74 year old (1945), who is being seen today for a visit. HPI information obtained from: facility chart records, facility staff and [facility] chart review. Today's concern is: back pain, LLE stiffness, dementia.  S/p incident of falling into another resident 10/17/20. Did not fall to floor.  Prior to fall, staff noted resident bending over at times, rubbing low back.  Also noted to have some stiffness of LLE.  Not currently taking pain meds. Gait gen. Stable, not favoring LLE. Has had increased insomnia past few nights, some increased episodes of agitation-receives prn depakote and seroquel in addition to sched. Doses.       Past Medical and Surgical History reviewed in Epic today.  MEDICATIONS:    Current Outpatient Medications   Medication Sig Dispense Refill     acetaminophen (TYLENOL) 500 MG tablet Take 1,000 mg by mouth 2 times daily And every " day prn       QUEtiapine (SEROQUEL) 50 MG tablet Take 50 mg by mouth 2 times daily And 75 mg at bedtime.  seroquel 50 mg bid prn       trolamine salicylate (ASPERCREME) 10 % external cream Apply 1 g topically 2 times daily       atenolol (TENORMIN) 50 MG tablet Take 50 mg by mouth daily       divalproex sodium delayed-release (DEPAKOTE) 125 MG DR tablet Take 250 mg by mouth every morning And 125 mg bid       docusate sodium (COLACE) 100 MG capsule Take 100 mg by mouth daily       donepezil (ARICEPT) 10 MG tablet Take 10 mg by mouth At Bedtime       lisinopril-hydrochlorothiazide (PRINZIDE/ZESTORETIC) 20-25 MG tablet Take 1 tablet by mouth daily       sertraline (ZOLOFT) 50 MG tablet Take 75 mg by mouth daily       simvastatin (ZOCOR) 20 MG tablet Take 20 mg by mouth At Bedtime       REVIEW OF SYSTEMS: Unobtainable secondary to cognitive impairment.   Objective: /88   Pulse 61   Temp 98.2  F (36.8  C)   Resp 16   SpO2 93%   Limited visit exam done given COVID-19 precautions.   GENERAL APPEARANCE:  Alert, in no distress, cooperative  ENT:  Mouth and posterior oropharynx normal, moist mucous membranes, [place]  EYES:  EOM normal, Conjunctiva and lids normal  NECK:  FROM  RESP:  lungs clear to auscultation , no respiratory distress  CV:  no edema, rate-normal  M/S:   Gait and station normal  muscle strength 5/5 all 4 ext.  NEURO:   Cranial nerves 2-12 are normal tested and grossly at patient's baseline  PSYCH:  insight and judgement impaired, memory impaired , affect and mood normal  Labs:     Most Recent 3 BMP's:  Recent Labs   Lab Test 04/28/20 02/05/20 10/01/19    136 132*   POTASSIUM 3.9 3.9 3.8   CHLORIDE 100 100 94*   CO2 28 26 28   BUN 26 17 19   CR 1.20 1.27 1.21   ANIONGAP 9 10 10   ALOK 9.1 9.6 9.6    127* 118       ASSESSMENT/PLAN:  Acute midline low back pain without sciatica  Newer onset. Observed to rub low back at times. Recent fall  1. Start sched., prn tylenol  2. Start bid  aspercreme  3. Reassess over next couple days  4. If pain not improved, consider lumbar spine XR    Joint stiffness of left lower leg  Newer onset, appears to coincide with low back pain  1. Tylenol as above  2. Monitor for LE weakness  3. Monitor for changes in gait, falls  4. Encourage amb. As jin    Frontal lobe dementia (H)  Memory loss, increased freq. Of agitation, insomnia over past few days, may be r/t #1  1. tx pain as above  2. Cont. Sched. Seroquel, increase prn seroquel to 50 mg bid  3. Cont. Sched, prn depakote  4. Cont. zoloft  5. For ongoing insomnia, consider alt. Antidepressant-remeron        Electronically signed by:  SAVANNAH Seymour CNP     Video-Visit Details  Type of service:  Video Visit  Video End Time (time video stopped): 1026  Distant Location (provider location):  [place] [facility]         Sincerely,        SAVANNAH Seymour CNP

## 2020-10-21 ENCOUNTER — ASSISTED LIVING VISIT (OUTPATIENT)
Dept: GERIATRICS | Facility: CLINIC | Age: 75
End: 2020-10-21
Payer: COMMERCIAL

## 2020-10-21 VITALS
SYSTOLIC BLOOD PRESSURE: 117 MMHG | DIASTOLIC BLOOD PRESSURE: 67 MMHG | HEART RATE: 55 BPM | OXYGEN SATURATION: 98 % | RESPIRATION RATE: 16 BRPM

## 2020-10-21 DIAGNOSIS — F02.80 FRONTAL LOBE DEMENTIA (H): ICD-10-CM

## 2020-10-21 DIAGNOSIS — I10 ESSENTIAL HYPERTENSION: Primary | ICD-10-CM

## 2020-10-21 DIAGNOSIS — M54.50 ACUTE MIDLINE LOW BACK PAIN WITHOUT SCIATICA: ICD-10-CM

## 2020-10-21 DIAGNOSIS — G31.09 FRONTAL LOBE DEMENTIA (H): ICD-10-CM

## 2020-10-21 RX ORDER — ATENOLOL 25 MG/1
25 TABLET ORAL DAILY
COMMUNITY

## 2020-10-21 NOTE — PROGRESS NOTES
[place] [facility]  [place] Medical Record Number: [id number]  Place of Service where encounter took place: [facility] ([facility] [[id number]]  Chief Complaint   Patient presents with     Hypertension     Back Pain       HPI:    Eduardo Singh is a 74 year old (1945), who is being seen today for an episodic care visit. HPI information obtained from: facility chart records, facility staff and [facility] chart review. Today's concern is: HTN, back pain, dementia.  Has had low back pain. Started on tylenol, aspercreme to area.  Per staff, pain appears improved-not bending over, stretching back as much. Gait stable. LE strength baseline.  Has insomnia-slept better past 2 night.  Some agitation at times-prn seroquel has been effective past 2 days.  Also taking depakote, zoloft, aricept.  Prn hs trazodone added.  For HTN taking zestoretic, atenolol. BP sl. Lower today. Bradycardia with HR of 55.  Per staff, has had stable po intake.      Past Medical and Surgical History reviewed in Epic today.    MEDICATIONS:    Current Outpatient Medications   Medication Sig Dispense Refill     atenolol (TENORMIN) 25 MG tablet Take 25 mg by mouth daily       acetaminophen (TYLENOL) 500 MG tablet Take 1,000 mg by mouth 2 times daily And every day prn       divalproex sodium delayed-release (DEPAKOTE) 125 MG DR tablet Take 250 mg by mouth every morning And 125 mg bid       docusate sodium (COLACE) 100 MG capsule Take 100 mg by mouth daily       donepezil (ARICEPT) 10 MG tablet Take 10 mg by mouth At Bedtime       lisinopril-hydrochlorothiazide (PRINZIDE/ZESTORETIC) 20-25 MG tablet Take 1 tablet by mouth daily       QUEtiapine (SEROQUEL) 50 MG tablet Take 50 mg by mouth 2 times daily And 75 mg at bedtime.  seroquel 50 mg bid prn       sertraline (ZOLOFT) 50 MG tablet Take 75 mg by mouth daily       simvastatin (ZOCOR) 20 MG tablet Take 20 mg by mouth At Bedtime       trolamine salicylate (ASPERCREME) 10 %  external cream Apply 1 g topically 2 times daily       REVIEW OF SYSTEMS:  Unobtainable secondary to cognitive impairment.     Objective:  /67   Pulse 55   Resp 16   SpO2 98%   Exam:  GENERAL APPEARANCE:  Alert, in no distress, cooperative  ENT:  Mouth and posterior oropharynx normal, moist mucous membranes, [place]  EYES:  EOM, conjunctivae, lids, pupils and irises normal, PERRL  NECK:  FROM  RESP:  respiratory effort and palpation of chest normal, lungs clear to auscultation , no respiratory distress  CV:  Palpation and auscultation of heart done , no edema, bradycardic HR 55  ABDOMEN:  normal bowel sounds, soft, nontender, no hepatosplenomegaly or other masses, no guarding or rebound  M/S:   Gait and station normal  no pain with palp. of spine or ROM LEs  NEURO:   Cranial nerves 2-12 are normal tested and grossly at patient's baseline, exp. aphasia  PSYCH:  affect and mood normal, no apparent anxiety    Labs:     Most Recent 3 CBC's:  Recent Labs   Lab Test 10/01/19   WBC 9.1   HGB 14.4   MCV 92        Most Recent 3 BMP's:  Recent Labs   Lab Test 04/28/20 02/05/20 10/01/19    136 132*   POTASSIUM 3.9 3.9 3.8   CHLORIDE 100 100 94*   CO2 28 26 28   BUN 26 17 19   CR 1.20 1.27 1.21   ANIONGAP 9 10 10   ALOK 9.1 9.6 9.6    127* 118       ASSESSMENT/PLAN:  Essential hypertension  BPs stable. Bradycardia this am  1. Cont. zestoretic  2. Decrease atenolol to 25 mg every day  3. Follow BPs, HRs  4. Encourage fluids  5. For ongoing bradycardia may decrease aricept dose    Acute midline low back pain without sciatica  Appears improved  1. Cont. Tylenol  2. Cont. aspercreme  3. Monitor for further behavior of bending over, trying to stretch back  4. Monitor or decreased act. Level, changes in gait    Frontal lobe dementia (H)  Memory loss. Insomnia, agitation at time  1. Cont. Aricept, serqouel, zoloft, depakote  2. For increased agitation cont. To use prn seroquel  3. Cont. Prn trazodone, for  ongoing insomnia, may sched. Hs trazodone      Electronically signed by:  SAVANNAH Seymour CNP

## 2020-10-21 NOTE — LETTER
10/21/2020        RE: Eduardo Singh  [address] [address]        [place] [facility]  [place] Medical Record Number: [id number]  Place of Service where encounter took place: [facility] ([facility] [[id number]]  Chief Complaint   Patient presents with     Hypertension     Back Pain       HPI:    Eduardo Singh is a 74 year old (1945), who is being seen today for an episodic care visit. HPI information obtained from: facility chart records, facility staff and [facility] chart review. Today's concern is: HTN, back pain, dementia.  Has had low back pain. Started on tylenol, aspercreme to area.  Per staff, pain appears improved-not bending over, stretching back as much. Gait stable. LE strength baseline.  Has insomnia-slept better past 2 night.  Some agitation at times-prn seroquel has been effective past 2 days.  Also taking depakote, zoloft, aricept.  Prn hs trazodone added.  For HTN taking zestoretic, atenolol. BP sl. Lower today. Bradycardia with HR of 55.  Per staff, has had stable po intake.      Past Medical and Surgical History reviewed in Epic today.    MEDICATIONS:    Current Outpatient Medications   Medication Sig Dispense Refill     atenolol (TENORMIN) 25 MG tablet Take 25 mg by mouth daily       acetaminophen (TYLENOL) 500 MG tablet Take 1,000 mg by mouth 2 times daily And every day prn       divalproex sodium delayed-release (DEPAKOTE) 125 MG DR tablet Take 250 mg by mouth every morning And 125 mg bid       docusate sodium (COLACE) 100 MG capsule Take 100 mg by mouth daily       donepezil (ARICEPT) 10 MG tablet Take 10 mg by mouth At Bedtime       lisinopril-hydrochlorothiazide (PRINZIDE/ZESTORETIC) 20-25 MG tablet Take 1 tablet by mouth daily       QUEtiapine (SEROQUEL) 50 MG tablet Take 50 mg by mouth 2 times daily And 75 mg at bedtime.  seroquel 50 mg bid prn       sertraline (ZOLOFT) 50 MG tablet Take 75 mg by mouth daily       simvastatin (ZOCOR)  20 MG tablet Take 20 mg by mouth At Bedtime       trolamine salicylate (ASPERCREME) 10 % external cream Apply 1 g topically 2 times daily       REVIEW OF SYSTEMS:  Unobtainable secondary to cognitive impairment.     Objective:  /67   Pulse 55   Resp 16   SpO2 98%   Exam:  GENERAL APPEARANCE:  Alert, in no distress, cooperative  ENT:  Mouth and posterior oropharynx normal, moist mucous membranes, [place]  EYES:  EOM, conjunctivae, lids, pupils and irises normal, PERRL  NECK:  FROM  RESP:  respiratory effort and palpation of chest normal, lungs clear to auscultation , no respiratory distress  CV:  Palpation and auscultation of heart done , no edema, bradycardic HR 55  ABDOMEN:  normal bowel sounds, soft, nontender, no hepatosplenomegaly or other masses, no guarding or rebound  M/S:   Gait and station normal  no pain with palp. of spine or ROM LEs  NEURO:   Cranial nerves 2-12 are normal tested and grossly at patient's baseline, exp. aphasia  PSYCH:  affect and mood normal, no apparent anxiety    Labs:     Most Recent 3 CBC's:  Recent Labs   Lab Test 10/01/19   WBC 9.1   HGB 14.4   MCV 92        Most Recent 3 BMP's:  Recent Labs   Lab Test 04/28/20 02/05/20 10/01/19    136 132*   POTASSIUM 3.9 3.9 3.8   CHLORIDE 100 100 94*   CO2 28 26 28   BUN 26 17 19   CR 1.20 1.27 1.21   ANIONGAP 9 10 10   ALOK 9.1 9.6 9.6    127* 118       ASSESSMENT/PLAN:  Essential hypertension  BPs stable. Bradycardia this am  1. Cont. zestoretic  2. Decrease atenolol to 25 mg every day  3. Follow BPs, HRs  4. Encourage fluids  5. For ongoing bradycardia may decrease aricept dose    Acute midline low back pain without sciatica  Appears improved  1. Cont. Tylenol  2. Cont. aspercreme  3. Monitor for further behavior of bending over, trying to stretch back  4. Monitor or decreased act. Level, changes in gait    Frontal lobe dementia (H)  Memory loss. Insomnia, agitation at time  1. Cont. Aricept, serqouel, zoloft,  depakote  2. For increased agitation cont. To use prn seroquel  3. Cont. Prn trazodone, for ongoing insomnia, may sched. Hs trazodone      Electronically signed by:  SAVANNAH Seymour CNP           Sincerely,        SAVANNAH Seymour CNP

## 2020-10-26 ENCOUNTER — ASSISTED LIVING VISIT (OUTPATIENT)
Dept: GERIATRICS | Facility: CLINIC | Age: 75
End: 2020-10-26
Payer: COMMERCIAL

## 2020-10-26 VITALS
DIASTOLIC BLOOD PRESSURE: 67 MMHG | SYSTOLIC BLOOD PRESSURE: 130 MMHG | OXYGEN SATURATION: 93 % | RESPIRATION RATE: 18 BRPM | HEART RATE: 77 BPM

## 2020-10-26 DIAGNOSIS — F41.1 GENERALIZED ANXIETY DISORDER: Primary | ICD-10-CM

## 2020-10-26 DIAGNOSIS — F02.80 FRONTAL LOBE DEMENTIA (H): ICD-10-CM

## 2020-10-26 DIAGNOSIS — L30.9 DERMATITIS: ICD-10-CM

## 2020-10-26 DIAGNOSIS — G31.09 FRONTAL LOBE DEMENTIA (H): ICD-10-CM

## 2020-10-26 RX ORDER — HALOPERIDOL 0.5 MG/1
0.5 TABLET ORAL 3 TIMES DAILY
COMMUNITY
End: 2020-01-01

## 2020-10-26 RX ORDER — RISPERIDONE 1 MG/1
1 TABLET ORAL 2 TIMES DAILY
COMMUNITY
End: 2021-01-01

## 2020-10-26 NOTE — PROGRESS NOTES
[place] [facility]  [place] [facility] Record Number: [id number]  Place of Service where encounter took place: [facility] ([facility] [[id number]]  Chief Complaint   Patient presents with     Anxiety     Dementia     Derm Problem       HPI:    Eduardo Singh is a 74 year old (1945), who is being seen today for an episodic care visit. HPI information obtained from: facility chart records, facility staff, patient report and [facility] chart review. Today's concern is: anxiety, dementia, dermatitis.  Has had ongoing increased anxiety, restlessness, insomnia.  Had had increased agitation, striking out at staff at times.  Resistive with taking meds at times. For dementia cont. On seroquel, depakote. Started on prn haldol which is effective at times, however works short-term. Has had ongoing hand redness. No reports of hand pain today. Some newer scab areas bilat hands.  Decreased redness today compared with visit last week.       Past Medical and Surgical History reviewed in Epic today.    MEDICATIONS:    Current Outpatient Medications   Medication Sig Dispense Refill     haloperidol (HALDOL) 0.5 MG tablet Take 0.5 mg by mouth 3 times daily And bid prn       risperiDONE (RISPERDAL) 1 MG tablet Take 1 mg by mouth 2 times daily       acetaminophen (TYLENOL) 500 MG tablet Take 1,000 mg by mouth 2 times daily And every day prn       atenolol (TENORMIN) 25 MG tablet Take 25 mg by mouth daily       divalproex sodium delayed-release (DEPAKOTE) 125 MG DR tablet Take 250 mg by mouth every morning And 125 mg bid       docusate sodium (COLACE) 100 MG capsule Take 100 mg by mouth daily       donepezil (ARICEPT) 10 MG tablet Take 10 mg by mouth At Bedtime       lisinopril-hydrochlorothiazide (PRINZIDE/ZESTORETIC) 20-25 MG tablet Take 1 tablet by mouth daily       sertraline (ZOLOFT) 50 MG tablet Take 75 mg by mouth daily       simvastatin (ZOCOR) 20 MG tablet Take 20 mg by mouth At Bedtime       trolamine  salicylate (ASPERCREME) 10 % external cream Apply 1 g topically 2 times daily       REVIEW OF SYSTEMS:  Unobtainable secondary to cognitive impairment. Denies current hand pain    Objective:  /67   Pulse 77   Resp 18   SpO2 93%   Exam:  GENERAL APPEARANCE:  Alert, anxious, cooperative  ENT:  Mouth and posterior oropharynx normal, moist mucous membranes, [place]  EYES:  EOM, conjunctivae, lids, pupils and irises normal, PERRL  NECK:  FROM  RESP:  respiratory effort and palpation of chest normal, lungs clear to auscultation , no respiratory distress  CV:  Palpation and auscultation of heart done , regular rate and rhythm, no murmur, rub, or gallop, no edema  ABDOMEN:  normal bowel sounds, soft, nontender, no hepatosplenomegaly or other masses, no guarding or rebound  M/S:   Gait and station normal  muscle strength 5/5 all 4 ext.  SKIN:  sl pink of bilat hands. no increased warmth. several scabbed areas bilat hands. no active bleed  NEURO:   Cranial nerves 2-12 are normal tested and grossly at patient's baseline, aphasia  PSYCH:  affect and mood normal, anxious    Labs:     Most Recent 3 CBC's:  Recent Labs   Lab Test 10/01/19   WBC 9.1   HGB 14.4   MCV 92        Most Recent 3 BMP's:  Recent Labs   Lab Test 04/28/20 02/05/20 10/01/19    136 132*   POTASSIUM 3.9 3.9 3.8   CHLORIDE 100 100 94*   CO2 28 26 28   BUN 26 17 19   CR 1.20 1.27 1.21   ANIONGAP 9 10 10   ALOK 9.1 9.6 9.6    127* 118       ASSESSMENT/PLAN:  Generalized anxiety disorder  Ongoing s/s, increased restlessness at times. Ongoing insomnia  1. Cont. zoloft  2. Cont. Prn hs trazodone  3. Reassess for ongoing insomnia over next couple days  4. If s/s not improved over next few days, consider remeron    Frontal lobe dementia (H)  Ongoing agitation, occ aggressive behaviors, decreased responsiveness to staff redirection  1. Discontinue seroquel  2. Start risperdal 1 mg bid  3. Start haldol 0.5 mg tid, bid prn  4. Cont. depakote  5.  Reassess mood, behaviors over next couple days    Dermatitis  bilat hands. Decreased redness since previous visit. Newer scabs on hands  1. Monitor for further increased redness  2. Monitor for active bleed of scabs  3. Reassess over next couple days      Electronically signed by:  SAVANNAH Seymour CNP

## 2020-10-26 NOTE — LETTER
10/26/2020        RE: Eduardo Singh  [address] [facility] [address]        [place] [facility]  [place] Medical Record Number: [id number]  Place of Service where encounter took place: [facility] [[id number]]  Chief Complaint   Patient presents with     Anxiety     Dementia     Derm Problem       HPI:    Eduardo Singh is a 74 year old (1945), who is being seen today for an episodic care visit. HPI information obtained from: facility chart records, facility staff, patient report and [facility] chart review. Today's concern is: anxiety, dementia, dermatitis.  Has had ongoing increased anxiety, restlessness, insomnia.  Had had increased agitation, striking out at staff at times.  Resistive with taking meds at times. For dementia cont. On seroquel, depakote. Started on prn haldol which is effective at times, however works short-term. Has had ongoing hand redness. No reports of hand pain today. Some newer scab areas bilat hands.  Decreased redness today compared with visit last week.       Past Medical and Surgical History reviewed in Epic today.    MEDICATIONS:    Current Outpatient Medications   Medication Sig Dispense Refill     haloperidol (HALDOL) 0.5 MG tablet Take 0.5 mg by mouth 3 times daily And bid prn       risperiDONE (RISPERDAL) 1 MG tablet Take 1 mg by mouth 2 times daily       acetaminophen (TYLENOL) 500 MG tablet Take 1,000 mg by mouth 2 times daily And every day prn       atenolol (TENORMIN) 25 MG tablet Take 25 mg by mouth daily       divalproex sodium delayed-release (DEPAKOTE) 125 MG DR tablet Take 250 mg by mouth every morning And 125 mg bid       docusate sodium (COLACE) 100 MG capsule Take 100 mg by mouth daily       donepezil (ARICEPT) 10 MG tablet Take 10 mg by mouth At Bedtime       lisinopril-hydrochlorothiazide (PRINZIDE/ZESTORETIC) 20-25 MG tablet Take 1 tablet by mouth daily       sertraline (ZOLOFT) 50 MG tablet Take 75 mg by mouth  daily       simvastatin (ZOCOR) 20 MG tablet Take 20 mg by mouth At Bedtime       trolamine salicylate (ASPERCREME) 10 % external cream Apply 1 g topically 2 times daily       REVIEW OF SYSTEMS:  Unobtainable secondary to cognitive impairment. Denies current hand pain    Objective:  /67   Pulse 77   Resp 18   SpO2 93%   Exam:  GENERAL APPEARANCE:  Alert, anxious, cooperative  ENT:  Mouth and posterior oropharynx normal, moist mucous membranes, [place]  EYES:  EOM, conjunctivae, lids, pupils and irises normal, PERRL  NECK:  FROM  RESP:  respiratory effort and palpation of chest normal, lungs clear to auscultation , no respiratory distress  CV:  Palpation and auscultation of heart done , regular rate and rhythm, no murmur, rub, or gallop, no edema  ABDOMEN:  normal bowel sounds, soft, nontender, no hepatosplenomegaly or other masses, no guarding or rebound  M/S:   Gait and station normal  muscle strength 5/5 all 4 ext.  SKIN:  sl pink of bilat hands. no increased warmth. several scabbed areas bilat hands. no active bleed  NEURO:   Cranial nerves 2-12 are normal tested and grossly at patient's baseline, aphasia  PSYCH:  affect and mood normal, anxious    Labs:     Most Recent 3 CBC's:  Recent Labs   Lab Test 10/01/19   WBC 9.1   HGB 14.4   MCV 92        Most Recent 3 BMP's:  Recent Labs   Lab Test 04/28/20 02/05/20 10/01/19    136 132*   POTASSIUM 3.9 3.9 3.8   CHLORIDE 100 100 94*   CO2 28 26 28   BUN 26 17 19   CR 1.20 1.27 1.21   ANIONGAP 9 10 10   ALOK 9.1 9.6 9.6    127* 118       ASSESSMENT/PLAN:  Generalized anxiety disorder  Ongoing s/s, increased restlessness at times. Ongoing insomnia  1. Cont. zoloft  2. Cont. Prn hs trazodone  3. Reassess for ongoing insomnia over next couple days  4. If s/s not improved over next few days, consider remeron    Frontal lobe dementia (H)  Ongoing agitation, occ aggressive behaviors, decreased responsiveness to staff redirection  1. Discontinue  seroquel  2. Start risperdal 1 mg bid  3. Start haldol 0.5 mg tid, bid prn  4. Cont. depakote  5. Reassess mood, behaviors over next couple days    Dermatitis  bilat hands. Decreased redness since previous visit. Newer scabs on hands  1. Monitor for further increased redness  2. Monitor for active bleed of scabs  3. Reassess over next couple days      Electronically signed by:  SAVANNAH Seymour CNP           Sincerely,        SAVANNAH Seymour CNP

## 2020-11-16 ENCOUNTER — VIRTUAL VISIT (OUTPATIENT)
Dept: GERIATRICS | Facility: CLINIC | Age: 75
End: 2020-11-16
Payer: COMMERCIAL

## 2020-11-16 DIAGNOSIS — F02.80 FRONTAL LOBE DEMENTIA (H): ICD-10-CM

## 2020-11-16 DIAGNOSIS — M54.50 MIDLINE LOW BACK PAIN WITHOUT SCIATICA, UNSPECIFIED CHRONICITY: Primary | ICD-10-CM

## 2020-11-16 DIAGNOSIS — G31.09 FRONTAL LOBE DEMENTIA (H): ICD-10-CM

## 2020-11-16 NOTE — PROGRESS NOTES
"[place] [facility]   Eduardo Singh is being evaluated via a billable video visit.   The patient has been notified of following:  \"This video visit will be conducted via a call between you and your provider. We have found that certain health care needs can be provided without the need for an in-person physical exam.  This service lets us provide the care you need with a video conversation. If during the course of the call the provider feels a video visit is not appropriate, you will not be charged for this service.\"   The provider has received verbal consent for a Video Visit from the patient or first contact? Yes  Patient  or facility staff would like the video invitation sent by: Text to cell phone: [phone]  Video Start Time: 0906  [place] Medical Record Number: [id number]  Place of Location at the time of visit: [facility]   Chief Complaint   Patient presents with     Video Visit     Pain     Dementia     HPI:  Eduardo Singh is a 74 year old (1945), who is being seen today for a visit. HPI information obtained from: facility chart records, facility staff and [facility] chart review. Today's concern is: back pain, dementia.  Has had recent increased back pain.  Started on tylenol, aspercreme.  Staff reports back pain appear stable.  Gait steady. Has recent h/o aggressive behaviors.  Has dementia with exp aphasia.  serqblancael dc'd due to ineffectiveness.  Started on haldol, risperdal added.  Since this point mood more stable. Sig. Decrease freq. Of agitation.  More redirectable.  Able to sit in chair for longer periods of time, less restless.  No reports of increased daytime lethargy.  Also taking depakote, zoloft. No recent reports of insomnia.       Past Medical and Surgical History reviewed in Epic today.  MEDICATIONS:    Current Outpatient Medications   Medication Sig Dispense Refill     acetaminophen (TYLENOL) 500 MG tablet Take 1,000 mg by mouth 2 " times daily And every day prn       atenolol (TENORMIN) 25 MG tablet Take 25 mg by mouth daily       divalproex sodium delayed-release (DEPAKOTE) 125 MG DR tablet Take 250 mg by mouth every morning And 125 mg bid       docusate sodium (COLACE) 100 MG capsule Take 100 mg by mouth daily       donepezil (ARICEPT) 10 MG tablet Take 10 mg by mouth At Bedtime       haloperidol (HALDOL) 0.5 MG tablet Take 0.5 mg by mouth 3 times daily And bid prn       lisinopril-hydrochlorothiazide (PRINZIDE/ZESTORETIC) 20-25 MG tablet Take 1 tablet by mouth daily       risperiDONE (RISPERDAL) 1 MG tablet Take 1 mg by mouth 2 times daily       sertraline (ZOLOFT) 50 MG tablet Take 75 mg by mouth daily       simvastatin (ZOCOR) 20 MG tablet Take 20 mg by mouth At Bedtime       trolamine salicylate (ASPERCREME) 10 % external cream Apply 1 g topically 2 times daily       REVIEW OF SYSTEMS: Unobtainable secondary to cognitive impairment.   Objective: There were no vitals taken for this visit.  Limited visit exam done given COVID-19 precautions.   GENERAL APPEARANCE:  Alert, in no distress, cooperative  ENT:  Mouth and posterior oropharynx normal, moist mucous membranes  EYES:  EOM normal, Conjunctiva and lids normal  NECK:  FROM  RESP:  no respiratory distress  CV:  no edema, rate-normal  M/S:   Gait and station normal  muscle strength 5/5 all 4 ext.  NEURO:   Cranial nerves 2-12 are normal tested and grossly at patient's baseline  PSYCH:  affect and mood normal, exp. aphasia  Labs:     Most Recent 3 BMP's:  Recent Labs   Lab Test 04/28/20 02/05/20 10/01/19    136 132*   POTASSIUM 3.9 3.9 3.8   CHLORIDE 100 100 94*   CO2 28 26 28   BUN 26 17 19   CR 1.20 1.27 1.21   ANIONGAP 9 10 10   ALOK 9.1 9.6 9.6    127* 118     Most Recent Hemoglobin A1c:  Recent Labs   Lab Test 04/28/20   A1C 6.5*       ASSESSMENT/PLAN:  Midline low back pain without sciatica, unspecified chronicity  Appears stable  1. Cont. Tylenol, aspercreme  2. Monitor  for reports of resident rubbing back  3. Monitor for decreased mobility  4. For increased back pain, may consider voltatren gel    Frontal lobe dementia (H)  Memory loss. Exp. Aphasia. Decreased freq. Of agitation, aggressive behaviors  1. Cont. zoloft  2. Cont. Haldol, risperdal  3. Cont. depakote  4. Monitor for insomnia  5. If mood, behaviors stable over next couple weeks, may trial GDR of haldol  6. hgba1c tomorrow        Electronically signed by:  SAVANNAH Seymour CNP     Video-Visit Details  Type of service:  Video Visit  Video End Time (time video stopped): 0914  Distant Location (provider location):  [facility]

## 2020-11-16 NOTE — LETTER
"    11/16/2020        RE: Eduardo Singh  [address] [address]        [place] [facility]   Eduardo Singh is being evaluated via a billable video visit.   The patient has been notified of following:  \"This video visit will be conducted via a call between you and your provider. We have found that certain health care needs can be provided without the need for an in-person physical exam.  This service lets us provide the care you need with a video conversation. If during the course of the call the provider feels a video visit is not appropriate, you will not be charged for this service.\"   The provider has received verbal consent for a Video Visit from the patient or first contact? Yes  Patient  or facility staff would like the video invitation sent by: Text to cell phone: [phone]  Video Start Time: 0906  [place] Medical Record Number: [id number]  Place of Location at the time of visit: [facility]   Chief Complaint   Patient presents with     Video Visit     Pain     Dementia     HPI:  Eduardo Singh is a 74 year old (1945), who is being seen today for a visit. HPI information obtained from: facility chart records, facility staff and [facility] chart review. Today's concern is: back pain, dementia.  Has had recent increased back pain.  Started on tylenol, aspercreme.  Staff reports back pain appear stable.  Gait steady. Has recent h/o aggressive behaviors.  Has dementia with exp aphasia.  serqouel dc'd due to ineffectiveness.  Started on haldol, risperdal added.  Since this point mood more stable. Sig. Decrease freq. Of agitation.  More redirectable.  Able to sit in chair for longer periods of time, less restless.  No reports of increased daytime lethargy.  Also taking depakote, zoloft. No recent reports of insomnia.       Past Medical and Surgical History reviewed in Epic today.  MEDICATIONS:    Current Outpatient Medications "   Medication Sig Dispense Refill     acetaminophen (TYLENOL) 500 MG tablet Take 1,000 mg by mouth 2 times daily And every day prn       atenolol (TENORMIN) 25 MG tablet Take 25 mg by mouth daily       divalproex sodium delayed-release (DEPAKOTE) 125 MG DR tablet Take 250 mg by mouth every morning And 125 mg bid       docusate sodium (COLACE) 100 MG capsule Take 100 mg by mouth daily       donepezil (ARICEPT) 10 MG tablet Take 10 mg by mouth At Bedtime       haloperidol (HALDOL) 0.5 MG tablet Take 0.5 mg by mouth 3 times daily And bid prn       lisinopril-hydrochlorothiazide (PRINZIDE/ZESTORETIC) 20-25 MG tablet Take 1 tablet by mouth daily       risperiDONE (RISPERDAL) 1 MG tablet Take 1 mg by mouth 2 times daily       sertraline (ZOLOFT) 50 MG tablet Take 75 mg by mouth daily       simvastatin (ZOCOR) 20 MG tablet Take 20 mg by mouth At Bedtime       trolamine salicylate (ASPERCREME) 10 % external cream Apply 1 g topically 2 times daily       REVIEW OF SYSTEMS: Unobtainable secondary to cognitive impairment.   Objective: There were no vitals taken for this visit.  Limited visit exam done given COVID-19 precautions.   GENERAL APPEARANCE:  Alert, in no distress, cooperative  ENT:  Mouth and posterior oropharynx normal, moist mucous membranes  EYES:  EOM normal, Conjunctiva and lids normal  NECK:  FROM  RESP:  no respiratory distress  CV:  no edema, rate-normal  M/S:   Gait and station normal  muscle strength 5/5 all 4 ext.  NEURO:   Cranial nerves 2-12 are normal tested and grossly at patient's baseline  PSYCH:  affect and mood normal, exp. aphasia  Labs:     Most Recent 3 BMP's:  Recent Labs   Lab Test 04/28/20 02/05/20 10/01/19    136 132*   POTASSIUM 3.9 3.9 3.8   CHLORIDE 100 100 94*   CO2 28 26 28   BUN 26 17 19   CR 1.20 1.27 1.21   ANIONGAP 9 10 10   ALKO 9.1 9.6 9.6    127* 118     Most Recent Hemoglobin A1c:  Recent Labs   Lab Test 04/28/20   A1C 6.5*       ASSESSMENT/PLAN:  Midline low back pain  without sciatica, unspecified chronicity  Appears stable  1. Cont. Tylenol, aspercreme  2. Monitor for reports of resident rubbing back  3. Monitor for decreased mobility  4. For increased back pain, may consider voltatren gel    Frontal lobe dementia (H)  Memory loss. Exp. Aphasia. Decreased freq. Of agitation, aggressive behaviors  1. Cont. zoloft  2. Cont. Haldol, risperdal  3. Cont. depakote  4. Monitor for insomnia  5. If mood, behaviors stable over next couple weeks, may trial GDR of haldol  6. hgba1c tomorrow        Electronically signed by:  SAVANNAH Seymour CNP     Video-Visit Details  Type of service:  Video Visit  Video End Time (time video stopped): 0914  Distant Location (provider location):  [place] [facility]             Sincerely,        SAVANNAH Seymour CNP

## 2020-11-27 NOTE — LETTER
"    11/27/2020        RE: Eduardo Singh  [address] [address]        [place] [facility]   Eduardo Singh is being evaluated via a billable video visit.   The patient has been notified of following:  \"This video visit will be conducted via a call between you and your provider. We have found that certain health care needs can be provided without the need for an in-person physical exam.  This service lets us provide the care you need with a video conversation. If during the course of the call the provider feels a video visit is not appropriate, you will not be charged for this service.\"   The provider has received verbal consent for a Video Visit from the patient or first contact? Yes  Patient  or facility staff would like the video invitation sent by: Text to cell phone: [phone]  Video Start Time: 0840  [place] Medical Record Number: [id number]  Place of Location at the time of visit: [facility]   Chief Complaint   Patient presents with     Video Visit     Weight Loss     HPI:  Eduardo Singh is a 74 year old (1945), who is being seen today for a visit. HPI information obtained from: facility chart records, facility staff and [facility] chart review. Today's concern is: wt. Loss, bradycardia.  Has had ongoing gradual wt. Loss.  Down about 10 lbs over pat 2 mos.  Po intake gen. Stable. Cont on ensure. Has had increase of psych meds due to aggressive behaviors. risperdal started. Cont. On haldol, depakote, zoloft, aricept.  Noted to have bradycardia today-51. Cont. On atenolol, lisinopril-hydrochlorothiazide for HTN.  Did have fall 2 days ago, missed chair when sitting down.  No injuries.       Past Medical and Surgical History reviewed in Epic today.  MEDICATIONS:    Current Outpatient Medications   Medication Sig Dispense Refill     divalproex sodium delayed-release (DEPAKOTE) 125 MG DR tablet Take 125 mg by mouth 2 times daily       " acetaminophen (TYLENOL) 500 MG tablet Take 1,000 mg by mouth 2 times daily And every day prn       atenolol (TENORMIN) 25 MG tablet Take 25 mg by mouth daily       docusate sodium (COLACE) 100 MG capsule Take 100 mg by mouth daily       donepezil (ARICEPT) 10 MG tablet Take 10 mg by mouth At Bedtime       haloperidol (HALDOL) 0.5 MG tablet Take 0.5 mg by mouth 3 times daily And bid prn       lisinopril-hydrochlorothiazide (PRINZIDE/ZESTORETIC) 20-25 MG tablet Take 1 tablet by mouth daily       risperiDONE (RISPERDAL) 1 MG tablet Take 1 mg by mouth 2 times daily       sertraline (ZOLOFT) 50 MG tablet Take 75 mg by mouth daily       simvastatin (ZOCOR) 20 MG tablet Take 20 mg by mouth At Bedtime       trolamine salicylate (ASPERCREME) 10 % external cream Apply 1 g topically 2 times daily       REVIEW OF SYSTEMS: Unobtainable secondary to cognitive impairment.   Objective: BP (!) 148/82   Pulse 51   Resp 16   Wt 72.6 kg (160 lb)   Limited visit exam done given COVID-19 precautions.   GENERAL APPEARANCE:  Alert, in no distress, cooperative  ENT:  Mouth and posterior oropharynx normal, moist mucous membranes, [place]  EYES:  EOM normal, Conjunctiva and lids normal  NECK:  FROM  RESP:  no respiratory distress  CV:  no edema, bradycardic HR 51  M/S:   Gait and station normal  NEURO:   Cranial nerves 2-12 are normal tested and grossly at patient's baseline  PSYCH:  insight and judgement impaired, memory impaired , affect and mood normal  Labs:     Most Recent 3 CBC's:  Recent Labs   Lab Test 10/01/19   WBC 9.1   HGB 14.4   MCV 92        Most Recent 3 BMP's:  Recent Labs   Lab Test 04/28/20 02/05/20 10/01/19    136 132*   POTASSIUM 3.9 3.9 3.8   CHLORIDE 100 100 94*   CO2 28 26 28   BUN 26 17 19   CR 1.20 1.27 1.21   ANIONGAP 9 10 10   ALOK 9.1 9.6 9.6    127* 118       ASSESSMENT/PLAN:  Weight loss  Ongoing. Does amb freq. On unit. Po intake gen. Stable. Recent Ensure increase to 2 bottles every day  1.  Cont. To follow wt., po intake  2. Cont. Ensure 2 bottles every day  3. Decrease depakote to 125 mg bid  4. Monitor for daytime sleepiness, decreased alertness affecting po intake  5. If mood, behaviors stable over next week, may decrease haldol dose  6. hgba1c next week    Bradycardia  HR 51 today. HR typically in upper 50s-60s.   1. Cont. Atenolol with hold parameter for HR < 60  2. Cont. Lisinopril-hydrochlorothiazide  3. Monitor for further elevated BPs  4. Reassess HRs over next week, if consistently < 60, may decrease aricept dose        Electronically signed by:  SAVANNAH Seymour CNP     Video-Visit Details  Type of service:  Video Visit  Video End Time (time video stopped): 0849  Distant Location (provider location):  [place] [facility]             Sincerely,        SAVANNAH Seymour CNP

## 2020-11-27 NOTE — PROGRESS NOTES
"[place] [facility]   Eduardo Singh is being evaluated via a billable video visit.   The patient has been notified of following:  \"This video visit will be conducted via a call between you and your provider. We have found that certain health care needs can be provided without the need for an in-person physical exam.  This service lets us provide the care you need with a video conversation. If during the course of the call the provider feels a video visit is not appropriate, you will not be charged for this service.\"   The provider has received verbal consent for a Video Visit from the patient or first contact? Yes  Patient  or facility staff would like the video invitation sent by: Text to cell phone: [phone]  Video Start Time: 0840  [place] Medical Record Number: [id number]  Place of Location at the time of visit: [facility]   Chief Complaint   Patient presents with     Video Visit     Weight Loss     HPI:  Eduardo Singh is a 74 year old (1945), who is being seen today for a visit. HPI information obtained from: facility chart records, facility staff and [facility] chart review. Today's concern is: wt. Loss, bradycardia.  Has had ongoing gradual wt. Loss.  Down about 10 lbs over pat 2 mos.  Po intake gen. Stable. Cont on ensure. Has had increase of psych meds due to aggressive behaviors. risperdal started. Cont. On haldol, depakote, zoloft, aricept.  Noted to have bradycardia today-51. Cont. On atenolol, lisinopril-hydrochlorothiazide for HTN.  Did have fall 2 days ago, missed chair when sitting down.  No injuries.       Past Medical and Surgical History reviewed in Epic today.  MEDICATIONS:    Current Outpatient Medications   Medication Sig Dispense Refill     divalproex sodium delayed-release (DEPAKOTE) 125 MG DR tablet Take 125 mg by mouth 2 times daily       acetaminophen (TYLENOL) 500 MG tablet Take 1,000 mg by mouth 2 times daily And every day prn   "     atenolol (TENORMIN) 25 MG tablet Take 25 mg by mouth daily       docusate sodium (COLACE) 100 MG capsule Take 100 mg by mouth daily       donepezil (ARICEPT) 10 MG tablet Take 10 mg by mouth At Bedtime       haloperidol (HALDOL) 0.5 MG tablet Take 0.5 mg by mouth 3 times daily And bid prn       lisinopril-hydrochlorothiazide (PRINZIDE/ZESTORETIC) 20-25 MG tablet Take 1 tablet by mouth daily       risperiDONE (RISPERDAL) 1 MG tablet Take 1 mg by mouth 2 times daily       sertraline (ZOLOFT) 50 MG tablet Take 75 mg by mouth daily       simvastatin (ZOCOR) 20 MG tablet Take 20 mg by mouth At Bedtime       trolamine salicylate (ASPERCREME) 10 % external cream Apply 1 g topically 2 times daily       REVIEW OF SYSTEMS: Unobtainable secondary to cognitive impairment.   Objective: BP (!) 148/82   Pulse 51   Resp 16   Wt 72.6 kg (160 lb)   Limited visit exam done given COVID-19 precautions.   GENERAL APPEARANCE:  Alert, in no distress, cooperative  ENT:  Mouth and posterior oropharynx normal, moist mucous membranes, [place]  EYES:  EOM normal, Conjunctiva and lids normal  NECK:  FROM  RESP:  no respiratory distress  CV:  no edema, bradycardic HR 51  M/S:   Gait and station normal  NEURO:   Cranial nerves 2-12 are normal tested and grossly at patient's baseline  PSYCH:  insight and judgement impaired, memory impaired , affect and mood normal  Labs:     Most Recent 3 CBC's:  Recent Labs   Lab Test 10/01/19   WBC 9.1   HGB 14.4   MCV 92        Most Recent 3 BMP's:  Recent Labs   Lab Test 04/28/20 02/05/20 10/01/19    136 132*   POTASSIUM 3.9 3.9 3.8   CHLORIDE 100 100 94*   CO2 28 26 28   BUN 26 17 19   CR 1.20 1.27 1.21   ANIONGAP 9 10 10   ALOK 9.1 9.6 9.6    127* 118       ASSESSMENT/PLAN:  Weight loss  Ongoing. Does amb freq. On unit. Po intake gen. Stable. Recent Ensure increase to 2 bottles every day  1. Cont. To follow wt., po intake  2. Cont. Ensure 2 bottles every day  3. Decrease depakote to 125  mg bid  4. Monitor for daytime sleepiness, decreased alertness affecting po intake  5. If mood, behaviors stable over next week, may decrease haldol dose  6. hgba1c next week    Bradycardia  HR 51 today. HR typically in upper 50s-60s.   1. Cont. Atenolol with hold parameter for HR < 60  2. Cont. Lisinopril-hydrochlorothiazide  3. Monitor for further elevated BPs  4. Reassess HRs over next week, if consistently < 60, may decrease aricept dose        Electronically signed by:  SAVANNAH Seymour CNP     Video-Visit Details  Type of service:  Video Visit  Video End Time (time video stopped): 0849  Distant Location (provider location):  [facility]

## 2020-12-03 NOTE — LETTER
12/3/2020        RE: Eduardo Singh  [address] [facility] [address]        [place] [facility]  [place] Medical Record Number: [id number]  Place of Service where encounter took place: [facility] ([facility] [[id number]]  Chief Complaint   Patient presents with     Fatigue       HPI:    Eduardo Singh is a 74 year old (1945), who is being seen today for an episodic care visit. HPI information obtained from: facility chart records, facility staff and [facility] chart review. Today's concern is: bradycardia, dementia.  HR stable today.  Has had HRs in 50s at times.  Cont. On atenolol, zestoretic for HTN.  BPs stable.  Fluid intake adequate per staff.  Has had some ongoing decreased act. Level during the day, some increased fatigue at times.  Resp. Status stable, afebrile.  Neg covid test this week.  For dementia cont. On depakote, aricept, risperdal, haldol.  No recent reports of aggressive behaviors. depakote decrease last week.  No reports of insomnia.       Past Medical and Surgical History reviewed in Epic today.    MEDICATIONS:    Current Outpatient Medications   Medication Sig Dispense Refill     haloperidol (HALDOL) 0.5 MG tablet Take 0.5 mg by mouth daily And bid prn       acetaminophen (TYLENOL) 500 MG tablet Take 1,000 mg by mouth 2 times daily And every day prn       atenolol (TENORMIN) 25 MG tablet Take 25 mg by mouth daily       divalproex sodium delayed-release (DEPAKOTE) 125 MG DR tablet Take 125 mg by mouth 2 times daily       docusate sodium (COLACE) 100 MG capsule Take 100 mg by mouth daily       donepezil (ARICEPT) 10 MG tablet Take 10 mg by mouth At Bedtime       lisinopril-hydrochlorothiazide (PRINZIDE/ZESTORETIC) 20-25 MG tablet Take 1 tablet by mouth daily       risperiDONE (RISPERDAL) 1 MG tablet Take 1 mg by mouth 2 times daily       sertraline (ZOLOFT) 50 MG tablet Take 75 mg by mouth daily       simvastatin (ZOCOR) 20 MG tablet Take 20 mg by mouth  At Bedtime       trolamine salicylate (ASPERCREME) 10 % external cream Apply 1 g topically 2 times daily       REVIEW OF SYSTEMS:  Unobtainable secondary to cognitive impairment.     Objective:  /81   Pulse 59   Resp 16   SpO2 92%   Exam:  GENERAL APPEARANCE:  Alert, in no distress, cooperative  ENT:  Mouth and posterior oropharynx normal, moist mucous membranes  EYES:  EOM, conjunctivae, lids, pupils and irises normal, PERRL  NECK:  FROM, sl flexed neck  RESP:  respiratory effort and palpation of chest normal, lungs clear to auscultation , no respiratory distress  CV:  Palpation and auscultation of heart done , no edema, rate-normal, grade 2/6 murmur  ABDOMEN:  normal bowel sounds, soft, nontender, no hepatosplenomegaly or other masses, no guarding or rebound  M/S:   Gait and station normal  muscle strength 5/5 all 4 ext.  NEURO:   Cranial nerves 2-12 are normal tested and grossly at patient's baseline  PSYCH:  affect abnormal -flat, no apparent anxiety    Labs:     Most Recent 3 CBC's:  Recent Labs   Lab Test 10/01/19   WBC 9.1   HGB 14.4   MCV 92        Most Recent 3 BMP's:  Recent Labs   Lab Test 04/28/20 02/05/20 10/01/19    136 132*   POTASSIUM 3.9 3.9 3.8   CHLORIDE 100 100 94*   CO2 28 26 28   BUN 26 17 19   CR 1.20 1.27 1.21   ANIONGAP 9 10 10   ALOK 9.1 9.6 9.6    127* 118     Most Recent Hemoglobin A1c:  Recent Labs   Lab Test 04/28/20   A1C 6.5*       ASSESSMENT/PLAN:  Bradycardia  HRs gen stable. occ HRs upper 50s.  No recent falls  1. Cont. Atenolol, zestoretic  2. For increased bradycardia, may decrease atenolol dose  3. Follow BPs, HRs  4. Encourage fluids    Frontal lobe dementia (H)  Some increased fatigue at times, decreased act. Level. No recent aggressive behaviors  1. Cont. Depakote, risperdal, aricept, zoloft  2. Decrease haldol to every day, and prn  3. Reassess over next week, if mood, behaviors stable, may discontinue sched. Haldol dose  4. Encourage increased  act. level      Electronically signed by:  SAVANNAH Seymour CNP               Sincerely,        SAVANNAH Seymour CNP

## 2020-12-03 NOTE — PROGRESS NOTES
[place] [facility]  [place] Medical Record Number: [id number]  Place of Service where encounter took place: [facility] ([facility] [[id number]]  Chief Complaint   Patient presents with     Fatigue       HPI:    Eduardo Singh is a 74 year old (1945), who is being seen today for an episodic care visit. HPI information obtained from: facility chart records, facility staff and [facility] chart review. Today's concern is: bradycardia, dementia.  HR stable today.  Has had HRs in 50s at times.  Cont. On atenolol, zestoretic for HTN.  BPs stable.  Fluid intake adequate per staff.  Has had some ongoing decreased act. Level during the day, some increased fatigue at times.  Resp. Status stable, afebrile.  Neg covid test this week.  For dementia cont. On depakote, aricept, risperdal, haldol.  No recent reports of aggressive behaviors. depakote decrease last week.  No reports of insomnia.       Past Medical and Surgical History reviewed in Epic today.    MEDICATIONS:    Current Outpatient Medications   Medication Sig Dispense Refill     haloperidol (HALDOL) 0.5 MG tablet Take 0.5 mg by mouth daily And bid prn       acetaminophen (TYLENOL) 500 MG tablet Take 1,000 mg by mouth 2 times daily And every day prn       atenolol (TENORMIN) 25 MG tablet Take 25 mg by mouth daily       divalproex sodium delayed-release (DEPAKOTE) 125 MG DR tablet Take 125 mg by mouth 2 times daily       docusate sodium (COLACE) 100 MG capsule Take 100 mg by mouth daily       donepezil (ARICEPT) 10 MG tablet Take 10 mg by mouth At Bedtime       lisinopril-hydrochlorothiazide (PRINZIDE/ZESTORETIC) 20-25 MG tablet Take 1 tablet by mouth daily       risperiDONE (RISPERDAL) 1 MG tablet Take 1 mg by mouth 2 times daily       sertraline (ZOLOFT) 50 MG tablet Take 75 mg by mouth daily       simvastatin (ZOCOR) 20 MG tablet Take 20 mg by mouth At Bedtime       trolamine salicylate (ASPERCREME) 10 % external cream Apply 1 g topically 2  times daily       REVIEW OF SYSTEMS:  Unobtainable secondary to cognitive impairment.     Objective:  /81   Pulse 59   Resp 16   SpO2 92%   Exam:  GENERAL APPEARANCE:  Alert, in no distress, cooperative  ENT:  Mouth and posterior oropharynx normal, moist mucous membranes  EYES:  EOM, conjunctivae, lids, pupils and irises normal, PERRL  NECK:  FROM, sl flexed neck  RESP:  respiratory effort and palpation of chest normal, lungs clear to auscultation , no respiratory distress  CV:  Palpation and auscultation of heart done , no edema, rate-normal, grade 2/6 murmur  ABDOMEN:  normal bowel sounds, soft, nontender, no hepatosplenomegaly or other masses, no guarding or rebound  M/S:   Gait and station normal  muscle strength 5/5 all 4 ext.  NEURO:   Cranial nerves 2-12 are normal tested and grossly at patient's baseline  PSYCH:  affect abnormal -flat, no apparent anxiety    Labs:     Most Recent 3 CBC's:  Recent Labs   Lab Test 10/01/19   WBC 9.1   HGB 14.4   MCV 92        Most Recent 3 BMP's:  Recent Labs   Lab Test 04/28/20 02/05/20 10/01/19    136 132*   POTASSIUM 3.9 3.9 3.8   CHLORIDE 100 100 94*   CO2 28 26 28   BUN 26 17 19   CR 1.20 1.27 1.21   ANIONGAP 9 10 10   ALOK 9.1 9.6 9.6    127* 118     Most Recent Hemoglobin A1c:  Recent Labs   Lab Test 04/28/20   A1C 6.5*       ASSESSMENT/PLAN:  Bradycardia  HRs gen stable. occ HRs upper 50s.  No recent falls  1. Cont. Atenolol, zestoretic  2. For increased bradycardia, may decrease atenolol dose  3. Follow BPs, HRs  4. Encourage fluids    Frontal lobe dementia (H)  Some increased fatigue at times, decreased act. Level. No recent aggressive behaviors  1. Cont. Depakote, risperdal, aricept, zoloft  2. Decrease haldol to every day, and prn  3. Reassess over next week, if mood, behaviors stable, may discontinue sched. Haldol dose  4. Encourage increased act. level      Electronically signed by:  SAVANNAH Seymour CNP

## 2020-12-22 NOTE — PROGRESS NOTES
[place] [facility]  [place] Medical Record Number: [id number]  Place of Service where encounter took place: [facility] ([facility] [[id number]]  Chief Complaint   Patient presents with     Back Pain       HPI:    Eduardo Singh is a 74 year old (1945), who is being seen today for an episodic care visit. HPI information obtained from: facility chart records, facility staff and [facility] chart review. Today's concern is: low back pain, dementia.  Has chronic low back pain.  Had been stable with tylenol, aspercreme.  Per staff, past few days has had increased flexion at waist with gait, observed rubbing low back at times, trying to stretch back.  No recent falls or bruising to area. Has memory loss, exp. Aphasia due to dementia.  Has had GDR of haldol due to increased daytime sleepiness.  No recent reports of aggressive behaviors.  Has had increased daytime alertness.       Past Medical and Surgical History reviewed in Epic today.    MEDICATIONS:    Current Outpatient Medications   Medication Sig Dispense Refill     acetaminophen (TYLENOL) 500 MG tablet Take 1,000 mg by mouth 2 times daily And every day prn       atenolol (TENORMIN) 25 MG tablet Take 25 mg by mouth daily       divalproex sodium delayed-release (DEPAKOTE) 125 MG DR tablet Take 125 mg by mouth 2 times daily       docusate sodium (COLACE) 100 MG capsule Take 100 mg by mouth daily       donepezil (ARICEPT) 10 MG tablet Take 10 mg by mouth At Bedtime       haloperidol (HALDOL) 0.5 MG tablet Take 0.5 mg by mouth daily And bid prn       lisinopril-hydrochlorothiazide (PRINZIDE/ZESTORETIC) 20-25 MG tablet Take 1 tablet by mouth daily       risperiDONE (RISPERDAL) 1 MG tablet Take 1 mg by mouth 2 times daily       sertraline (ZOLOFT) 50 MG tablet Take 75 mg by mouth daily       simvastatin (ZOCOR) 20 MG tablet Take 20 mg by mouth At Bedtime       trolamine salicylate (ASPERCREME) 10 % external cream Apply 1 g topically 2 times daily        REVIEW OF SYSTEMS:  Unobtainable secondary to cognitive impairment.     Objective:  /64   Pulse 60   Resp 18   SpO2 93%   Exam:  GENERAL APPEARANCE:  Alert, in no distress, cooperative  ENT:  Mouth and posterior oropharynx normal, moist mucous membranes  EYES:  EOM, conjunctivae, lids, pupils and irises normal, PERRL  NECK:  FROM  RESP:  respiratory effort and palpation of chest normal, lungs clear to auscultation , no respiratory distress  CV:  Palpation and auscultation of heart done , regular rate and rhythm, no murmur, rub, or gallop, peripheral edema trace+ in LEs  ABDOMEN:  normal bowel sounds, soft, nontender, no hepatosplenomegaly or other masses, no guarding or rebound  M/S:   sl flexed at waist with gait.  muscle strength 5/5 all 4 ext.  NEURO:   Cranial nerves 2-12 are normal tested and grossly at patient's baseline  PSYCH:  affect abnormal -flat. no apparent anxiety    Labs:     Most Recent 3 BMP's:  Recent Labs   Lab Test 04/28/20 02/05/20 10/01/19    136 132*   POTASSIUM 3.9 3.9 3.8   CHLORIDE 100 100 94*   CO2 28 26 28   BUN 26 17 19   CR 1.20 1.27 1.21   ANIONGAP 9 10 10   ALOK 9.1 9.6 9.6    127* 118       ASSESSMENT/PLAN:  Midline low back pain without sciatica, unspecified chronicity  Recent increase in s/s. Noted to rub low back.  More flexed at waist with gait  1. XR lumbar spine today to r/o acute process  2. Cont. Tylenol  3. Cont. aspercreme  4. If XR neg, consider starting voltaren gel  5. For further changes in gait consider PT    Frontal lobe dementia (H)  Memory loss. Exp. Aphasia.  No recent aggressive behaviors. Increased daytime alertness. Recent haldol GDR  1. Cont. Haldol, risperdal, depakote, zoloft  2. For target behaviors, utilize prn haldol  3. Reassess mood, behaviors over next week, if stable consider GDR risperdal      Electronically signed by:  SAVANNAH Seymour CNP

## 2020-12-22 NOTE — LETTER
12/22/2020        RE: Eduardo Singh  [address] [address]        [place] [facility]  [place] Medical Record Number: [id number]  Place of Service where encounter took place: [facility] ([facility] [[id number]]  Chief Complaint   Patient presents with     Back Pain       HPI:    Eduardo Singh is a 74 year old (1945), who is being seen today for an episodic care visit. HPI information obtained from: facility chart records, facility staff and [facility] chart review. Today's concern is: low back pain, dementia.  Has chronic low back pain.  Had been stable with tylenol, aspercreme.  Per staff, past few days has had increased flexion at waist with gait, observed rubbing low back at times, trying to stretch back.  No recent falls or bruising to area. Has memory loss, exp. Aphasia due to dementia.  Has had GDR of haldol due to increased daytime sleepiness.  No recent reports of aggressive behaviors.  Has had increased daytime alertness.       Past Medical and Surgical History reviewed in Epic today.    MEDICATIONS:    Current Outpatient Medications   Medication Sig Dispense Refill     acetaminophen (TYLENOL) 500 MG tablet Take 1,000 mg by mouth 2 times daily And every day prn       atenolol (TENORMIN) 25 MG tablet Take 25 mg by mouth daily       divalproex sodium delayed-release (DEPAKOTE) 125 MG DR tablet Take 125 mg by mouth 2 times daily       docusate sodium (COLACE) 100 MG capsule Take 100 mg by mouth daily       donepezil (ARICEPT) 10 MG tablet Take 10 mg by mouth At Bedtime       haloperidol (HALDOL) 0.5 MG tablet Take 0.5 mg by mouth daily And bid prn       lisinopril-hydrochlorothiazide (PRINZIDE/ZESTORETIC) 20-25 MG tablet Take 1 tablet by mouth daily       risperiDONE (RISPERDAL) 1 MG tablet Take 1 mg by mouth 2 times daily       sertraline (ZOLOFT) 50 MG tablet Take 75 mg by mouth daily       simvastatin (ZOCOR) 20 MG tablet Take 20 mg by mouth At Bedtime        trolamine salicylate (ASPERCREME) 10 % external cream Apply 1 g topically 2 times daily       REVIEW OF SYSTEMS:  Unobtainable secondary to cognitive impairment.     Objective:  /64   Pulse 60   Resp 18   SpO2 93%   Exam:  GENERAL APPEARANCE:  Alert, in no distress, cooperative  ENT:  Mouth and posterior oropharynx normal, moist mucous membranes  EYES:  EOM, conjunctivae, lids, pupils and irises normal, PERRL  NECK:  FROM  RESP:  respiratory effort and palpation of chest normal, lungs clear to auscultation , no respiratory distress  CV:  Palpation and auscultation of heart done , regular rate and rhythm, no murmur, rub, or gallop, peripheral edema trace+ in LEs  ABDOMEN:  normal bowel sounds, soft, nontender, no hepatosplenomegaly or other masses, no guarding or rebound  M/S:   sl flexed at waist with gait.  muscle strength 5/5 all 4 ext.  NEURO:   Cranial nerves 2-12 are normal tested and grossly at patient's baseline  PSYCH:  affect abnormal -flat. no apparent anxiety    Labs:     Most Recent 3 BMP's:  Recent Labs   Lab Test 04/28/20 02/05/20 10/01/19    136 132*   POTASSIUM 3.9 3.9 3.8   CHLORIDE 100 100 94*   CO2 28 26 28   BUN 26 17 19   CR 1.20 1.27 1.21   ANIONGAP 9 10 10   ALOK 9.1 9.6 9.6    127* 118       ASSESSMENT/PLAN:  Midline low back pain without sciatica, unspecified chronicity  Recent increase in s/s. Noted to rub low back.  More flexed at waist with gait  1. XR lumbar spine today to r/o acute process  2. Cont. Tylenol  3. Cont. aspercreme  4. If XR neg, consider starting voltaren gel  5. For further changes in gait consider PT    Frontal lobe dementia (H)  Memory loss. Exp. Aphasia.  No recent aggressive behaviors. Increased daytime alertness. Recent haldol GDR  1. Cont. Haldol, risperdal, depakote, zoloft  2. For target behaviors, utilize prn haldol  3. Reassess mood, behaviors over next week, if stable consider GDR risperdal      Electronically signed by:  Timur Burns  SAVANNAH Ram CNP               Sincerely,        SAVANNAH Seymour CNP

## 2021-01-01 ENCOUNTER — ASSISTED LIVING VISIT (OUTPATIENT)
Dept: GERIATRICS | Facility: CLINIC | Age: 76
End: 2021-01-01
Payer: COMMERCIAL

## 2021-01-01 VITALS
DIASTOLIC BLOOD PRESSURE: 89 MMHG | SYSTOLIC BLOOD PRESSURE: 149 MMHG | RESPIRATION RATE: 18 BRPM | HEART RATE: 59 BPM | OXYGEN SATURATION: 93 %

## 2021-01-01 VITALS
DIASTOLIC BLOOD PRESSURE: 76 MMHG | OXYGEN SATURATION: 92 % | TEMPERATURE: 98 F | SYSTOLIC BLOOD PRESSURE: 133 MMHG | HEART RATE: 78 BPM | RESPIRATION RATE: 18 BRPM

## 2021-01-01 VITALS
RESPIRATION RATE: 18 BRPM | HEART RATE: 58 BPM | SYSTOLIC BLOOD PRESSURE: 131 MMHG | DIASTOLIC BLOOD PRESSURE: 70 MMHG | OXYGEN SATURATION: 94 %

## 2021-01-01 VITALS
SYSTOLIC BLOOD PRESSURE: 150 MMHG | DIASTOLIC BLOOD PRESSURE: 88 MMHG | OXYGEN SATURATION: 93 % | RESPIRATION RATE: 16 BRPM | HEART RATE: 62 BPM

## 2021-01-01 VITALS
HEART RATE: 59 BPM | TEMPERATURE: 97.8 F | DIASTOLIC BLOOD PRESSURE: 78 MMHG | SYSTOLIC BLOOD PRESSURE: 161 MMHG | WEIGHT: 167 LBS

## 2021-01-01 VITALS
RESPIRATION RATE: 20 BRPM | WEIGHT: 148 LBS | HEART RATE: 79 BPM | SYSTOLIC BLOOD PRESSURE: 140 MMHG | OXYGEN SATURATION: 93 % | DIASTOLIC BLOOD PRESSURE: 80 MMHG

## 2021-01-01 VITALS — RESPIRATION RATE: 16 BRPM | SYSTOLIC BLOOD PRESSURE: 111 MMHG | HEART RATE: 66 BPM | DIASTOLIC BLOOD PRESSURE: 78 MMHG

## 2021-01-01 DIAGNOSIS — F02.80 FRONTAL LOBE DEMENTIA (H): ICD-10-CM

## 2021-01-01 DIAGNOSIS — G31.09 FRONTAL LOBE DEMENTIA (H): ICD-10-CM

## 2021-01-01 DIAGNOSIS — R52 PAIN: Primary | ICD-10-CM

## 2021-01-01 DIAGNOSIS — I10 ESSENTIAL HYPERTENSION: ICD-10-CM

## 2021-01-01 DIAGNOSIS — F29 PSYCHOSIS, UNSPECIFIED PSYCHOSIS TYPE (H): Primary | ICD-10-CM

## 2021-01-01 DIAGNOSIS — I10 ESSENTIAL HYPERTENSION: Primary | ICD-10-CM

## 2021-01-01 DIAGNOSIS — R29.6 RECURRENT FALLS: Primary | ICD-10-CM

## 2021-01-01 DIAGNOSIS — M54.50 MIDLINE LOW BACK PAIN WITHOUT SCIATICA, UNSPECIFIED CHRONICITY: Primary | ICD-10-CM

## 2021-01-01 DIAGNOSIS — R52 PAIN: ICD-10-CM

## 2021-01-01 DIAGNOSIS — M54.50 MIDLINE LOW BACK PAIN WITHOUT SCIATICA, UNSPECIFIED CHRONICITY: ICD-10-CM

## 2021-01-01 RX ORDER — RISPERIDONE 3 MG/1
3 TABLET ORAL EVERY MORNING
COMMUNITY

## 2021-01-01 RX ORDER — TRAZODONE HYDROCHLORIDE 50 MG/1
25 TABLET, FILM COATED ORAL
COMMUNITY

## 2021-01-01 RX ORDER — LORAZEPAM 0.5 MG/1
0.5 TABLET ORAL EVERY 4 HOURS PRN
COMMUNITY

## 2021-01-01 RX ORDER — IBUPROFEN 400 MG/1
400 TABLET, FILM COATED ORAL 2 TIMES DAILY PRN
COMMUNITY

## 2021-01-01 RX ORDER — HYOSCYAMINE SULFATE 0.125 MG
0.12 TABLET ORAL EVERY 6 HOURS PRN
COMMUNITY

## 2021-01-01 RX ORDER — DONEPEZIL HYDROCHLORIDE 5 MG/1
5 TABLET, FILM COATED ORAL AT BEDTIME
COMMUNITY
End: 2021-01-01

## 2021-01-01 RX ORDER — HALOPERIDOL 0.5 MG/1
0.5 TABLET ORAL 2 TIMES DAILY PRN
COMMUNITY

## 2021-01-01 RX ORDER — RISPERIDONE 1 MG/1
TABLET ORAL
Start: 2021-01-01 | End: 2021-01-01

## 2021-01-01 RX ORDER — MORPHINE SULFATE 30 MG/1
2.5 TABLET ORAL
COMMUNITY

## 2021-02-15 NOTE — LETTER
2/15/2021        RE: Eduardo Singh  [facility]  [address] [address]        Eduardo iSngh is a 75 year old male seen February 15, 2021 at [facility] where he has resided for one and a half years (admit 9/2019) seen to follow up worsening dementia. Pt is seen on the unit up to chair.   He is needing help with his lunch, no longer able to answer questions.   Nursing staff reports decline with weight loss, cognitive and physical decline.   Sleeps more and less behaviors, CNS medications have been decreased.  His mobility has slowed down and he needs more assist with all cares.     I talked to his wife by phone and reviewed course, medications.   She comes to visit, but sometimes pt becomes angry when she is here, not clear just why.          By chart review, patient has had progressive cognitive decline over the past 8 years, particularly with language.   He was seen by Neurology in 10/2013, noted to have a + family history of Alzheimer's dementia in first degree relatives    Pt thought to have early onset Alzheimer's dementia vs frontotemporal dementia and started on donepezil.   He had gradual progression until 2019 when he had more rapid changes and also developed agitation, started on quetiapine by his Neurologist at that time.      Past Medical History:   Diagnosis Date     Alzheimer disease (H)      Diabetes (H)      Erectile dysfunction      Hyperlipidemia      Hypertension      Impaired fasting glucose      Past Surgical History:   Procedure Laterality Date     CERVICAL LAMINECTOMY       SH:  Previously lived with his wife in [place]. They have 3 children.   Pt is retired from work as an  and controller.    ROS: unable to obtain secondary to aphasia.    Wt Readings from Last 5 Encounters:   02/15/21 75.8 kg (167 lb)   11/27/20 72.6 kg (160 lb)   11/01/19 78.9 kg (174 lb)      EXAM:  NAD  BP (!) 161/78   Pulse 59   Temp 97.8  F (36.6  C)    Wt 75.8 kg (167 lb)    Neck supple without adenopathy  Lungs clear bilaterally with fair air movement  Heart RRR s1s2  Abd soft, NT, no distention, +BS  Ext without edema  Neuro: +aphasia, steady gait, apraxic  Psych: affect flat, blank stare    LAB 9/2020:     Sodium 136 - 145 mmol/L 137    Potassium 3.5 - 5.1 mmol/L 3.8    Chloride 98 - 109 mmol/L 98    CO2 20 - 29 mmol/L 29    Anion Gap 7 - 16 mmol/L 10    Calcium 8.4 - 10.4 mg/dL 8.9    BUN 7 - 26 mg/dL 33High     Creatinine 0.73 - 1.18 mg/dL 1.00    GFR, Estimated >60 mL/min/1.73m2 >60    Glucose 70 - 100 mg/dL 150High       WBC 3.5 - 10.5 x10(9)/L 7.1    RBC 4.32 - 5.72 x10(12)/L 3.80Low     Hemoglobin 13.5 - 17.5 g/dL 11.8Low     HCT 38.8 - 50.0 % 37.0Low     MCV 80.0 - 100.0 fL 97.4    MCH 27.6 - 33.3 pg 31.1    MCHC 31.5 - 35.2 g/dL 31.9    RDW 11.9 - 15.5 % 12.1    Platelets 150 - 450 x10(9)/L 208          IMP/PLAN:     (I10) Essential hypertension    Comment:   BP Readings from Last 3 Encounters:   02/15/21 (!) 161/78   12/22/20 130/64   12/03/20 115/81      Pulse Readings from Last 4 Encounters:   02/15/21 59   12/22/20 60   12/03/20 59   11/27/20 51      Plan: lisinopril 20 mg/day, atenolol 25 mg daily and hydrochlorothiazide 25 mg/day    With lower HRs would consider discontinuation of this low dose atenolol.        (E11.22,  N18.3) Type 2 diabetes mellitus with stage 3 chronic kidney disease, without long-term current use of insulin (H)  Comment: 12/15/2020:  Hemoglobin A1C <=5.6 % 6.7High       Plan: diet-controlled.     He is on an ACEI and simvastatin 20 mg/day.        (F22) Paranoia (H)  Comment: with agitation and combativeness related to his worsening dementia    Plan: discontinue haloperidol at this time.   Change risperidone to 1.5 mg AM and 1mg PM to simplify regimen        Depakote 250 mg AMand 125 mg PM     (F41.1) Generalized anxiety disorder  Comment: worsened with cognitive decline     Plan: sertraline 75 mg/day        (G31.09,  F02.80)  Frontal lobe dementia (H)  Comment: with loss of language, decreased weight and low functional status     Plan: [facility] for assist with meds, meals, activity.   He has been on donepezil >5 years, currently on 10 mg/day  I talked with patient's wife by phone, reviewed above changes.     Claudia Pascual MD         Sincerely,        Claudia Pascual MD

## 2021-02-22 NOTE — PROGRESS NOTES
Eduardo Singh is a 75 year old male seen February 15, 2021 at [facility] where he has resided for one and a half years (admit 9/2019) seen to follow up worsening dementia. Pt is seen on the unit up to chair.   He is needing help with his lunch, no longer able to answer questions.   Nursing staff reports decline with weight loss, cognitive and physical decline.   Sleeps more and less behaviors, CNS medications have been decreased.  His mobility has slowed down and he needs more assist with all cares.     I talked to his wife by phone and reviewed course, medications.   She comes to visit, but sometimes pt becomes angry when she is here, not clear just why.          By chart review, patient has had progressive cognitive decline over the past 8 years, particularly with language.   He was seen by Neurology in 10/2013, noted to have a + family history of Alzheimer's dementia in first degree relatives    Pt thought to have early onset Alzheimer's dementia vs frontotemporal dementia and started on donepezil.   He had gradual progression until 2019 when he had more rapid changes and also developed agitation, started on quetiapine by his Neurologist at that time.      Past Medical History:   Diagnosis Date     Alzheimer disease (H)      Diabetes (H)      Erectile dysfunction      Hyperlipidemia      Hypertension      Impaired fasting glucose      Past Surgical History:   Procedure Laterality Date     CERVICAL LAMINECTOMY       SH:  Previously lived with his wife in [place]. They have 3 children.   Pt is retired from work as an  and controller.    ROS: unable to obtain secondary to aphasia.    Wt Readings from Last 5 Encounters:   02/15/21 75.8 kg (167 lb)   11/27/20 72.6 kg (160 lb)   11/01/19 78.9 kg (174 lb)      EXAM:  NAD  BP (!) 161/78   Pulse 59   Temp 97.8  F (36.6  C)   Wt 75.8 kg (167 lb)    Neck supple without adenopathy  Lungs clear bilaterally with fair air movement  Heart RRR  s1s2  Abd soft, NT, no distention, +BS  Ext without edema  Neuro: +aphasia, steady gait, apraxic  Psych: affect flat, blank stare    LAB 9/2020:     Sodium 136 - 145 mmol/L 137    Potassium 3.5 - 5.1 mmol/L 3.8    Chloride 98 - 109 mmol/L 98    CO2 20 - 29 mmol/L 29    Anion Gap 7 - 16 mmol/L 10    Calcium 8.4 - 10.4 mg/dL 8.9    BUN 7 - 26 mg/dL 33High     Creatinine 0.73 - 1.18 mg/dL 1.00    GFR, Estimated >60 mL/min/1.73m2 >60    Glucose 70 - 100 mg/dL 150High       WBC 3.5 - 10.5 x10(9)/L 7.1    RBC 4.32 - 5.72 x10(12)/L 3.80Low     Hemoglobin 13.5 - 17.5 g/dL 11.8Low     HCT 38.8 - 50.0 % 37.0Low     MCV 80.0 - 100.0 fL 97.4    MCH 27.6 - 33.3 pg 31.1    MCHC 31.5 - 35.2 g/dL 31.9    RDW 11.9 - 15.5 % 12.1    Platelets 150 - 450 x10(9)/L 208          IMP/PLAN:     (I10) Essential hypertension    Comment:   BP Readings from Last 3 Encounters:   02/15/21 (!) 161/78   12/22/20 130/64   12/03/20 115/81      Pulse Readings from Last 4 Encounters:   02/15/21 59   12/22/20 60   12/03/20 59   11/27/20 51      Plan: lisinopril 20 mg/day, atenolol 25 mg daily and hydrochlorothiazide 25 mg/day    With lower HRs would consider discontinuation of this low dose atenolol.        (E11.22,  N18.3) Type 2 diabetes mellitus with stage 3 chronic kidney disease, without long-term current use of insulin (H)  Comment: 12/15/2020:  Hemoglobin A1C <=5.6 % 6.7High       Plan: diet-controlled.     He is on an ACEI and simvastatin 20 mg/day.        (F22) Paranoia (H)  Comment: with agitation and combativeness related to his worsening dementia    Plan: discontinue haloperidol at this time.   Change risperidone to 1.5 mg AM and 1mg PM to simplify regimen        Depakote 250 mg AMand 125 mg PM     (F41.1) Generalized anxiety disorder  Comment: worsened with cognitive decline     Plan: sertraline 75 mg/day        (G31.09,  F02.80) Frontal lobe dementia (H)  Comment: with loss of language, decreased weight and low functional status     Plan:  [facility] for assist with meds, meals, activity.   He has been on donepezil >5 years, currently on 10 mg/day  I talked with patient's wife by phone, reviewed above changes.     Claudia Pascual MD

## 2021-03-05 NOTE — PROGRESS NOTES
[facility]  [place] Medical Record Number:  [id number]  Place of Service where encounter took place:  [facility] ([facility] [[id number]]  Chief Complaint   Patient presents with     Back Pain     Dementia       HPI:    Eduardo Singh  is a 75 year old (1945), who is being seen today for an episodic care visit.  HPI information obtained from: facility chart records, facility staff and [facility] chart review. Today's concern is: back pain, dementia, HTN.  Has had occ increased low back pain. Noted by staff bending over, rubbing back at times.  Cont. On tylenol. Has recently had course of ibuprofen which was effective.  Has memory loss. Exp. Aphasia due to dementia. Agitation, increased anxiety at times.  Recently started on risperdal which has been effective.  Decreased eipsodes of agitation. No recent reports of aggressive behaviors. Also taking depakote, zoloft.  Recent decrease in aricept. Dose.  For HTN cont. On zestoretic.  BP sl elevated today. Has variable BPs at times r/t increased anxiety.  HR stable.       Past Medical and Surgical History reviewed in Epic today.    MEDICATIONS:    Current Outpatient Medications   Medication Sig Dispense Refill     donepezil (ARICEPT) 5 MG tablet Take 5 mg by mouth At Bedtime       ibuprofen (ADVIL/MOTRIN) 400 MG tablet Take 400 mg by mouth 2 times daily as needed for moderate pain       acetaminophen (TYLENOL) 500 MG tablet Take 1,000 mg by mouth 2 times daily And every day prn       atenolol (TENORMIN) 25 MG tablet Take 25 mg by mouth daily       divalproex sodium delayed-release (DEPAKOTE) 125 MG DR tablet Take 125 mg by mouth 2 times daily       docusate sodium (COLACE) 100 MG capsule Take 100 mg by mouth daily       lisinopril-hydrochlorothiazide (PRINZIDE/ZESTORETIC) 20-25 MG tablet Take 1 tablet by mouth daily       risperiDONE (RISPERDAL) 1 MG tablet One and a half tabs in AM (1.5mg) and one tab at night (1mg)       sertraline  (ZOLOFT) 50 MG tablet Take 75 mg by mouth daily       simvastatin (ZOCOR) 20 MG tablet Take 20 mg by mouth At Bedtime       trolamine salicylate (ASPERCREME) 10 % external cream Apply 1 g topically 2 times daily           REVIEW OF SYSTEMS:  Unobtainable secondary to cognitive impairment.     Objective:  BP (!) 150/88   Pulse 62   Resp 16   SpO2 93%   Exam:  GENERAL APPEARANCE:  Alert, cooperative, sl. anxious  ENT:  Mouth and posterior oropharynx normal, moist mucous membranes, [place]  EYES:  EOM, conjunctivae, lids, pupils and irises normal, PERRL  RESP:  respiratory effort and palpation of chest normal, lungs clear to auscultation , no respiratory distress  CV:  Palpation and auscultation of heart done , regular rate and rhythm, no murmur, rub, or gallop, no edema  ABDOMEN:  normal bowel sounds, soft, nontender, no hepatosplenomegaly or other masses, no guarding or rebound  M/S:   Gait and station normal  sl flexed at waist. muscle strength 5/5 all 4 ext.  NEURO:   Cranial nerves 2-12 are normal tested and grossly at patient's baseline  PSYCH:  sl anxious, exp. aphasia    Labs:     Most Recent 3 CBC's:  Recent Labs   Lab Test 10/01/19   WBC 9.1   HGB 14.4   MCV 92        Most Recent 3 BMP's:  Recent Labs   Lab Test 04/28/20 02/05/20 10/01/19    136 132*   POTASSIUM 3.9 3.9 3.8   CHLORIDE 100 100 94*   CO2 28 26 28   BUN 26 17 19   CR 1.20 1.27 1.21   ANIONGAP 9 10 10   ALOK 9.1 9.6 9.6    127* 118       ASSESSMENT/PLAN:  Midline low back pain without sciatica, unspecified chronicity  occ increased s/s  1. Cont. Tylenol, aspercreme  2. Start ibuprofen 400 mg bid prn  3. Monitor for changes in gait, LE weakness  4. For further increase in s/s, may sched. Ibuprofen short-term    Frontal lobe dementia (H)  Memory loss. Exp aphasia. occ increased anxiety. Decrease in target behaviors with start of risperdal  1. Cot. risperdal  2. Cont. Depakote, zoloft  3. Cont. aricept 5 mg every day.  If mood,  behaviors stable over next month, may discontinue aricept    Essential hypertension  Sl. Elevated BP today. Variable BPs at times possibly r/t anxiety  1. Cont. zestoretic  2. Follow BPs, HRs  3. Encourage fluids  4. Cbc, bmp in next 1-3 mos      Electronically signed by:  SAVANNAH Seymour CNP

## 2021-03-05 NOTE — LETTER
3/5/2021        RE: Eduardo [facility]  [address] [facility] [address]        [facility]  [place] Medical Record Number:  [id number]  Place of Service where encounter took place:  [facility] ([facility]) [[id number]]  Chief Complaint   Patient presents with     Back Pain     Dementia       HPI:    Eduardo Sinhg  is a 75 year old (1945), who is being seen today for an episodic care visit.  HPI information obtained from: facility chart records, facility staff and [facility] chart review. Today's concern is: back pain, dementia, HTN.  Has had occ increased low back pain. Noted by staff bending over, rubbing back at times.  Cont. On tylenol. Has recently had course of ibuprofen which was effective.  Has memory loss. Exp. Aphasia due to dementia. Agitation, increased anxiety at times.  Recently started on risperdal which has been effective.  Decreased eipsodes of agitation. No recent reports of aggressive behaviors. Also taking depakote, zoloft.  Recent decrease in aricept. Dose.  For HTN cont. On zestoretic.  BP sl elevated today. Has variable BPs at times r/t increased anxiety.  HR stable.       Past Medical and Surgical History reviewed in Epic today.    MEDICATIONS:    Current Outpatient Medications   Medication Sig Dispense Refill     donepezil (ARICEPT) 5 MG tablet Take 5 mg by mouth At Bedtime       ibuprofen (ADVIL/MOTRIN) 400 MG tablet Take 400 mg by mouth 2 times daily as needed for moderate pain       acetaminophen (TYLENOL) 500 MG tablet Take 1,000 mg by mouth 2 times daily And every day prn       atenolol (TENORMIN) 25 MG tablet Take 25 mg by mouth daily       divalproex sodium delayed-release (DEPAKOTE) 125 MG DR tablet Take 125 mg by mouth 2 times daily       docusate sodium (COLACE) 100 MG capsule Take 100 mg by mouth daily       lisinopril-hydrochlorothiazide (PRINZIDE/ZESTORETIC) 20-25 MG tablet Take 1 tablet by mouth daily        risperiDONE (RISPERDAL) 1 MG tablet One and a half tabs in AM (1.5mg) and one tab at night (1mg)       sertraline (ZOLOFT) 50 MG tablet Take 75 mg by mouth daily       simvastatin (ZOCOR) 20 MG tablet Take 20 mg by mouth At Bedtime       trolamine salicylate (ASPERCREME) 10 % external cream Apply 1 g topically 2 times daily           REVIEW OF SYSTEMS:  Unobtainable secondary to cognitive impairment.     Objective:  BP (!) 150/88   Pulse 62   Resp 16   SpO2 93%   Exam:  GENERAL APPEARANCE:  Alert, cooperative, sl. anxious  ENT:  Mouth and posterior oropharynx normal, moist mucous membranes, [place]  EYES:  EOM, conjunctivae, lids, pupils and irises normal, PERRL  RESP:  respiratory effort and palpation of chest normal, lungs clear to auscultation , no respiratory distress  CV:  Palpation and auscultation of heart done , regular rate and rhythm, no murmur, rub, or gallop, no edema  ABDOMEN:  normal bowel sounds, soft, nontender, no hepatosplenomegaly or other masses, no guarding or rebound  M/S:   Gait and station normal  sl flexed at waist. muscle strength 5/5 all 4 ext.  NEURO:   Cranial nerves 2-12 are normal tested and grossly at patient's baseline  PSYCH:  sl anxious, exp. aphasia    Labs:     Most Recent 3 CBC's:  Recent Labs   Lab Test 10/01/19   WBC 9.1   HGB 14.4   MCV 92        Most Recent 3 BMP's:  Recent Labs   Lab Test 04/28/20 02/05/20 10/01/19    136 132*   POTASSIUM 3.9 3.9 3.8   CHLORIDE 100 100 94*   CO2 28 26 28   BUN 26 17 19   CR 1.20 1.27 1.21   ANIONGAP 9 10 10   ALOK 9.1 9.6 9.6    127* 118       ASSESSMENT/PLAN:  Midline low back pain without sciatica, unspecified chronicity  occ increased s/s  1. Cont. Tylenol, aspercreme  2. Start ibuprofen 400 mg bid prn  3. Monitor for changes in gait, LE weakness  4. For further increase in s/s, may sched. Ibuprofen short-term    Frontal lobe dementia (H)  Memory loss. Exp aphasia. occ increased anxiety. Decrease in target behaviors  with start of risperdal  1. Cot. risperdal  2. Cont. Depakote, zoloft  3. Cont. aricept 5 mg every day.  If mood, behaviors stable over next month, may discontinue aricept    Essential hypertension  Sl. Elevated BP today. Variable BPs at times possibly r/t anxiety  1. Cont. zestoretic  2. Follow BPs, HRs  3. Encourage fluids  4. Cbc, bmp in next 1-3 mos      Electronically signed by:  SAVANNAH Seymour CNP                 Sincerely,        SAVANNAH Seymour CNP

## 2021-03-26 NOTE — LETTER
3/26/2021        RE: Eduardo [facility]  [address] [facility] [address]        [facility]  [place] Medical Record Number:  [id number]  Place of Service where encounter took place:  [facility] [place] ([facility]) [[id number]]  Chief Complaint   Patient presents with     Fall       HPI:    Eduardo Singh  is a 75 year old (1945), who is being seen today for an episodic care visit.  HPI information obtained from: facility chart records, facility staff, [facility] chart review and Care Everywhere [facility] chart review. Today's concern is: falls, pain, HTN.  S/p fall this am. Apparently missed chair, fell on table, hitting R posterior ribs-has some sl bruising to site.  Has older bruising to R lower hip area.  Per stafff, has increased LE weakness this am. At times, has apparent increased low back pain which is chronic.  Cont.on tylenol, aspercreme, prn ibuporfen.  For HTN taking zestoretic, atenolol.  BPs, HRs stable.  Per staff, typically has adequate fluid intake.       Past Medical and Surgical History reviewed in Epic today.    MEDICATIONS:    Current Outpatient Medications   Medication Sig Dispense Refill     acetaminophen (TYLENOL) 500 MG tablet Take 1,000 mg by mouth 2 times daily And every day prn       atenolol (TENORMIN) 25 MG tablet Take 25 mg by mouth daily       divalproex sodium delayed-release (DEPAKOTE) 125 MG DR tablet Take 125 mg by mouth 2 times daily       docusate sodium (COLACE) 100 MG capsule Take 100 mg by mouth daily       donepezil (ARICEPT) 5 MG tablet Take 5 mg by mouth At Bedtime       ibuprofen (ADVIL/MOTRIN) 400 MG tablet Take 400 mg by mouth 2 times daily as needed for moderate pain       lisinopril-hydrochlorothiazide (PRINZIDE/ZESTORETIC) 20-25 MG tablet Take 1 tablet by mouth daily       risperiDONE (RISPERDAL) 1 MG tablet One and a half tabs in AM (1.5mg) and one tab at night (1mg)       sertraline (ZOLOFT) 50 MG tablet  Take 75 mg by mouth daily       simvastatin (ZOCOR) 20 MG tablet Take 20 mg by mouth At Bedtime       trolamine salicylate (ASPERCREME) 10 % external cream Apply 1 g topically 2 times daily           REVIEW OF SYSTEMS:  Unobtainable secondary to cognitive impairment.     Objective:  /76   Pulse 78   Temp 98  F (36.7  C)   Resp 18   SpO2 92%   Exam:  GENERAL APPEARANCE:  in no distress, cooperative, sleepy  ENT:  Mouth and posterior oropharynx normal, moist mucous membranes, [place]  EYES:  EOM, conjunctivae, lids, pupils and irises normal, PERRL  NECK:  FROM  RESP:  respiratory effort and palpation of chest normal, lungs clear to auscultation , no respiratory distress, diminished breath sounds bibasilar  CV:  Palpation and auscultation of heart done , regular rate and rhythm, no murmur, rub, or gallop, no edema  ABDOMEN:  normal bowel sounds, soft, nontender, no hepatosplenomegaly or other masses, no guarding or rebound  M/S:   muscle strength 5/5 all 4 ext., some gen. LE weakness. this am require staff assist to stand. hand hold assist with amb.  no apparent pain with ROM LEs, palp. of hip.  NEURO:   Cranial nerves 2-12 are normal tested and grossly at patient's baseline  PSYCH:  insight and judgement impaired, affect abnormal -flat    Labs:     Most Recent 3 CBC's:  Recent Labs   Lab Test 10/01/19   WBC 9.1   HGB 14.4   MCV 92        Most Recent 3 BMP's:  Recent Labs   Lab Test 04/28/20 02/05/20 10/01/19    136 132*   POTASSIUM 3.9 3.9 3.8   CHLORIDE 100 100 94*   CO2 28 26 28   BUN 26 17 19   CR 1.20 1.27 1.21   ANIONGAP 9 10 10   ALOK 9.1 9.6 9.6    127* 118       ASSESSMENT/PLAN:  Recurrent falls  S/p fall this am. Sl bruising to R posterior ribs. Increased need of staff assist at times with transfers, amb.  1. Spoke with spouse who will get different chair-current swivel chair  2. Assist as needed with transfers, amb  3. Monitor for further changes in gait  4. Given cog. Decline, would  likely have diff. With meaningful participation with PT    Pain  Ongoing chronic low back pain, possibly exac. By recent fall-faint R posterior rib bruising.  No pain elicited of hips during exam  1. Cont. Tylenol  2. Cont. aspercreme  3. Spoke with spouse who would like hospice referral.  Has had sig. Wt. Loss past couple mos    Essential hypertension  Currently stable  1. Cont. Zestoretic, atenolol  2. Follow BPs, HRs  3. Monitor for decreased in fluid intake, lower BPs-would consider discontinue of zestoretic      Electronically signed by:  SAVANNAH Seymour CNP                 Sincerely,        SAVANNAH Seymour CNP

## 2021-03-26 NOTE — PROGRESS NOTES
[facility]  [place] Medical Record Number:  [id number]  Place of Service where encounter took place:  [facility] ([facility] [[id number]]  Chief Complaint   Patient presents with     Fall       HPI:    Eduardo Singh  is a 75 year old (1945), who is being seen today for an episodic care visit.  HPI information obtained from: facility chart records, facility staff, [facility] chart review and Care Everywhere [facility] chart review. Today's concern is: falls, pain, HTN.  S/p fall this am. Apparently missed chair, fell on table, hitting R posterior ribs-has some sl bruising to site.  Has older bruising to R lower hip area.  Per stafff, has increased LE weakness this am. At times, has apparent increased low back pain which is chronic.  Cont.on tylenol, aspercreme, prn ibuporfen.  For HTN taking zestoretic, atenolol.  BPs, HRs stable.  Per staff, typically has adequate fluid intake.       Past Medical and Surgical History reviewed in Epic today.    MEDICATIONS:    Current Outpatient Medications   Medication Sig Dispense Refill     acetaminophen (TYLENOL) 500 MG tablet Take 1,000 mg by mouth 2 times daily And every day prn       atenolol (TENORMIN) 25 MG tablet Take 25 mg by mouth daily       divalproex sodium delayed-release (DEPAKOTE) 125 MG DR tablet Take 125 mg by mouth 2 times daily       docusate sodium (COLACE) 100 MG capsule Take 100 mg by mouth daily       donepezil (ARICEPT) 5 MG tablet Take 5 mg by mouth At Bedtime       ibuprofen (ADVIL/MOTRIN) 400 MG tablet Take 400 mg by mouth 2 times daily as needed for moderate pain       lisinopril-hydrochlorothiazide (PRINZIDE/ZESTORETIC) 20-25 MG tablet Take 1 tablet by mouth daily       risperiDONE (RISPERDAL) 1 MG tablet One and a half tabs in AM (1.5mg) and one tab at night (1mg)       sertraline (ZOLOFT) 50 MG tablet Take 75 mg by mouth daily       simvastatin (ZOCOR) 20 MG tablet Take 20 mg by mouth At Bedtime       trolamine  salicylate (ASPERCREME) 10 % external cream Apply 1 g topically 2 times daily           REVIEW OF SYSTEMS:  Unobtainable secondary to cognitive impairment.     Objective:  /76   Pulse 78   Temp 98  F (36.7  C)   Resp 18   SpO2 92%   Exam:  GENERAL APPEARANCE:  in no distress, cooperative, sleepy  ENT:  Mouth and posterior oropharynx normal, moist mucous membranes, [place]  EYES:  EOM, conjunctivae, lids, pupils and irises normal, PERRL  NECK:  FROM  RESP:  respiratory effort and palpation of chest normal, lungs clear to auscultation , no respiratory distress, diminished breath sounds bibasilar  CV:  Palpation and auscultation of heart done , regular rate and rhythm, no murmur, rub, or gallop, no edema  ABDOMEN:  normal bowel sounds, soft, nontender, no hepatosplenomegaly or other masses, no guarding or rebound  M/S:   muscle strength 5/5 all 4 ext., some gen. LE weakness. this am require staff assist to stand. hand hold assist with amb.  no apparent pain with ROM LEs, palp. of hip.  NEURO:   Cranial nerves 2-12 are normal tested and grossly at patient's baseline  PSYCH:  insight and judgement impaired, affect abnormal -flat    Labs:     Most Recent 3 CBC's:  Recent Labs   Lab Test 10/01/19   WBC 9.1   HGB 14.4   MCV 92        Most Recent 3 BMP's:  Recent Labs   Lab Test 04/28/20 02/05/20 10/01/19    136 132*   POTASSIUM 3.9 3.9 3.8   CHLORIDE 100 100 94*   CO2 28 26 28   BUN 26 17 19   CR 1.20 1.27 1.21   ANIONGAP 9 10 10   ALOK 9.1 9.6 9.6    127* 118       ASSESSMENT/PLAN:  Recurrent falls  S/p fall this am. Sl bruising to R posterior ribs. Increased need of staff assist at times with transfers, amb.  1. Spoke with spouse who will get different chair-current swivel chair  2. Assist as needed with transfers, amb  3. Monitor for further changes in gait  4. Given cog. Decline, would likely have diff. With meaningful participation with PT    Pain  Ongoing chronic low back pain, possibly exac.  By recent fall-faint R posterior rib bruising.  No pain elicited of hips during exam  1. Cont. Tylenol  2. Cont. aspercreme  3. Spoke with spouse who would like hospice referral.  Has had sig. Wt. Loss past couple mos    Essential hypertension  Currently stable  1. Cont. Zestoretic, atenolol  2. Follow BPs, HRs  3. Monitor for decreased in fluid intake, lower BPs-would consider discontinue of zestoretic      Electronically signed by:  SAVANNAH Seymour CNP

## 2021-05-12 NOTE — LETTER
5/12/2021        RE: Eduardo Singh  C/o Leonor Singh  [address] [address]        [place] [facility]  [place] [facility] Record Number:  [id number]  Place of Service where encounter took place:  [facility] ([facility] [[id number]]  Chief Complaint   Patient presents with     Pain       HPI:    Eduardo Singh  is a 75 year old (1945), who is being seen today for an episodic care visit.  HPI information obtained from: facility chart records, facility staff and [facility] chart review. Today's concern is: pain, dementia, HTN.  Cont. On hospice cares.  Has had gradual wt. Loss.  S/p fall 3/26/21 with R rib pain.  Currently stable.  Has chronic low back pain-occ increased s/s per staff. Cont. On tylenol, aspercreme. Does tend to lean at times with amb, standing.  For dementia cont. On depakote, aricept, zoloft, risperdal.  Has prn ativan, haldol.  No recent reports of increased agitation.  For HTN cont.on zestoretic. BPs gen. Stable.  Per staff, has adequate fluid intake.       Past Medical and Surgical History reviewed in Epic today.    MEDICATIONS:    Current Outpatient Medications   Medication Sig Dispense Refill     haloperidol (HALDOL) 0.5 MG tablet Take 0.5 mg by mouth 2 times daily as needed for agitation       hyoscyamine (LEVSIN) 0.125 MG tablet Take 0.125 mg by mouth every 6 hours as needed for cramping       LORazepam (ATIVAN) 0.5 MG tablet Take 0.5 mg by mouth every 4 hours as needed for anxiety       morphine 2.5 MG solu-tab Take 2.5 mg by mouth every hour as needed for shortness of breath / dyspnea or moderate to severe pain       traZODone (DESYREL) 50 MG tablet Take 25 mg by mouth nightly as needed for sleep       acetaminophen (TYLENOL) 500 MG tablet Take 1,000 mg by mouth 2 times daily And every day prn       atenolol (TENORMIN) 25 MG tablet Take 25 mg by mouth daily       divalproex sodium delayed-release (DEPAKOTE) 125 MG DR tablet Take 125 mg by  mouth 2 times daily       docusate sodium (COLACE) 100 MG capsule Take 100 mg by mouth daily       donepezil (ARICEPT) 5 MG tablet Take 5 mg by mouth At Bedtime       ibuprofen (ADVIL/MOTRIN) 400 MG tablet Take 400 mg by mouth 2 times daily as needed for moderate pain       lisinopril-hydrochlorothiazide (PRINZIDE/ZESTORETIC) 20-25 MG tablet Take 1 tablet by mouth daily       risperiDONE (RISPERDAL) 1 MG tablet One and a half tabs in AM (1.5mg) and one tab at night (1mg)       sertraline (ZOLOFT) 50 MG tablet Take 75 mg by mouth daily       simvastatin (ZOCOR) 20 MG tablet Take 20 mg by mouth At Bedtime       trolamine salicylate (ASPERCREME) 10 % external cream Apply 1 g topically 2 times daily           REVIEW OF SYSTEMS:  Unobtainable secondary to cognitive impairment.     Objective:  /70   Pulse 58   Resp 18   SpO2 94%   Exam:  GENERAL APPEARANCE:  Alert, in no distress, cooperative  ENT:  Mouth and posterior oropharynx normal, moist mucous membranes, [place]  EYES:  EOM, conjunctivae, lids, pupils and irises normal, PERRL  NECK:  No adenopathy,masses or thyromegaly, FROM  RESP:  respiratory effort and palpation of chest normal, lungs clear to auscultation , no respiratory distress  CV:  Palpation and auscultation of heart done , regular rate and rhythm, no murmur, rub, or gallop, no edema  ABDOMEN:  normal bowel sounds, soft, nontender, no hepatosplenomegaly or other masses, no guarding or rebound  M/S:   gait slowed, sl flexed at waist. muscle strength 5/5 all 4 ext.  NEURO:   Cranial nerves 2-12 are normal tested and grossly at patient's baseline  PSYCH:  insight and judgement impaired, memory impaired , affect and mood normal    Labs:     Most Recent 3 BMP's:  Recent Labs   Lab Test 04/28/20 02/05/20 10/01/19    136 132*   POTASSIUM 3.9 3.9 3.8   CHLORIDE 100 100 94*   CO2 28 26 28   BUN 26 17 19   CR 1.20 1.27 1.21   ANIONGAP 9 10 10   ALOK 9.1 9.6 9.6    127* 118        ASSESSMENT/PLAN:  Pain  Gen. Stable.  occ increased low back pain  1. Cont. Tylenol, aspercreme  2. Monitor for increased LE weakness, increased leaning  3. Monitor for changes in gait  4. For more sig. Pain, use prn MS    Frontal lobe dementia (H)  Memory loss, exp aphasia, no recent increase in agitation  1. Cont. Depakote, aricept, risperdal, zoloft  2. For increase anxiety, use prn ativan  3. Cont. Hospice cares  4. Follow po intake, wt.s    Essential hypertension  BP gen. Stable.  1. Cont. Zestoretic  2. Follow BPs, HRs  3. Monitor for decrease in fluid intake, low BPs  4. For above, may hold, discontinue zestoretic       Electronically signed by:  SAVANNAH Seymour CNP                 Sincerely,        SAVNANAH Seymour CNP

## 2021-05-12 NOTE — PROGRESS NOTES
[place] [facility]  [place] [facility] Record Number:  [id number]  Place of Service where encounter took place:  [facility] ([facility]) [[id number]]  Chief Complaint   Patient presents with     Pain       HPI:    Eduardo Singh  is a 75 year old (1945), who is being seen today for an episodic care visit.  HPI information obtained from: facility chart records, facility staff and [facility] chart review. Today's concern is: pain, dementia, HTN.  Cont. On hospice cares.  Has had gradual wt. Loss.  S/p fall 3/26/21 with R rib pain.  Currently stable.  Has chronic low back pain-occ increased s/s per staff. Cont. On tylenol, aspercreme. Does tend to lean at times with amb, standing.  For dementia cont. On depakote, aricept, zoloft, risperdal.  Has prn ativan, haldol.  No recent reports of increased agitation.  For HTN cont.on zestoretic. BPs gen. Stable.  Per staff, has adequate fluid intake.       Past Medical and Surgical History reviewed in Epic today.    MEDICATIONS:    Current Outpatient Medications   Medication Sig Dispense Refill     haloperidol (HALDOL) 0.5 MG tablet Take 0.5 mg by mouth 2 times daily as needed for agitation       hyoscyamine (LEVSIN) 0.125 MG tablet Take 0.125 mg by mouth every 6 hours as needed for cramping       LORazepam (ATIVAN) 0.5 MG tablet Take 0.5 mg by mouth every 4 hours as needed for anxiety       morphine 2.5 MG solu-tab Take 2.5 mg by mouth every hour as needed for shortness of breath / dyspnea or moderate to severe pain       traZODone (DESYREL) 50 MG tablet Take 25 mg by mouth nightly as needed for sleep       acetaminophen (TYLENOL) 500 MG tablet Take 1,000 mg by mouth 2 times daily And every day prn       atenolol (TENORMIN) 25 MG tablet Take 25 mg by mouth daily       divalproex sodium delayed-release (DEPAKOTE) 125 MG DR tablet Take 125 mg by mouth 2 times daily       docusate sodium (COLACE) 100 MG capsule Take 100 mg by mouth daily       donepezil  (ARICEPT) 5 MG tablet Take 5 mg by mouth At Bedtime       ibuprofen (ADVIL/MOTRIN) 400 MG tablet Take 400 mg by mouth 2 times daily as needed for moderate pain       lisinopril-hydrochlorothiazide (PRINZIDE/ZESTORETIC) 20-25 MG tablet Take 1 tablet by mouth daily       risperiDONE (RISPERDAL) 1 MG tablet One and a half tabs in AM (1.5mg) and one tab at night (1mg)       sertraline (ZOLOFT) 50 MG tablet Take 75 mg by mouth daily       simvastatin (ZOCOR) 20 MG tablet Take 20 mg by mouth At Bedtime       trolamine salicylate (ASPERCREME) 10 % external cream Apply 1 g topically 2 times daily           REVIEW OF SYSTEMS:  Unobtainable secondary to cognitive impairment.     Objective:  /70   Pulse 58   Resp 18   SpO2 94%   Exam:  GENERAL APPEARANCE:  Alert, in no distress, cooperative  ENT:  Mouth and posterior oropharynx normal, moist mucous membranes, [place]  EYES:  EOM, conjunctivae, lids, pupils and irises normal, PERRL  NECK:  No adenopathy,masses or thyromegaly, FROM  RESP:  respiratory effort and palpation of chest normal, lungs clear to auscultation , no respiratory distress  CV:  Palpation and auscultation of heart done , regular rate and rhythm, no murmur, rub, or gallop, no edema  ABDOMEN:  normal bowel sounds, soft, nontender, no hepatosplenomegaly or other masses, no guarding or rebound  M/S:   gait slowed, sl flexed at waist. muscle strength 5/5 all 4 ext.  NEURO:   Cranial nerves 2-12 are normal tested and grossly at patient's baseline  PSYCH:  insight and judgement impaired, memory impaired , affect and mood normal    Labs:     Most Recent 3 BMP's:  Recent Labs   Lab Test 04/28/20 02/05/20 10/01/19    136 132*   POTASSIUM 3.9 3.9 3.8   CHLORIDE 100 100 94*   CO2 28 26 28   BUN 26 17 19   CR 1.20 1.27 1.21   ANIONGAP 9 10 10   ALOK 9.1 9.6 9.6    127* 118       ASSESSMENT/PLAN:  Pain  Gen. Stable.  occ increased low back pain  1. Cont. Tylenol, aspercreme  2. Monitor for increased LE  weakness, increased leaning  3. Monitor for changes in gait  4. For more sig. Pain, use prn MS    Frontal lobe dementia (H)  Memory loss, exp aphasia, no recent increase in agitation  1. Cont. Depakote, aricept, risperdal, zoloft  2. For increase anxiety, use prn ativan  3. Cont. Hospice cares  4. Follow po intake, wt.s    Essential hypertension  BP gen. Stable.  1. Cont. Zestoretic  2. Follow BPs, HRs  3. Monitor for decrease in fluid intake, low BPs  4. For above, may hold, discontinue zestoretic       Electronically signed by:  SAVANNAH Seymour CNP

## 2021-09-01 NOTE — LETTER
9/1/2021        RE: Eduardo Singh  C/o Leonor Singh  [address] [facility] [address]        [place] [facility]  [place] Medical Record Number:  [id number]  Place of Service where encounter took place:  [facility] ([facility] [[id number]]  Chief Complaint   Patient presents with     Hypertension       HPI:    Eduardo Singh  is a 75 year old (1945), who is being seen today for an episodic care visit.  HPI information obtained from: facility chart records, facility staff and [facility] chart review. Today's concern is: HTN, dementia, back pain.  For HTN cont.on zestoretic, atenolol.  BPs gen. Stable. occ bradycardia.  HR stable. Today. For dementia cont. On depakote, aricept, risperdal. Decreased verbalizations.  No recent reports of increased agitation or aggressive behaviors.  Cont. Hospice cares.  No recent reports of back pain.  Cont. On tylenol, prn aspercreme, prn ibuprofen.  Has decrease in amb. Does foot propel in broda chair.       Past Medical and Surgical History reviewed in Epic today.    MEDICATIONS:    Current Outpatient Medications   Medication Sig Dispense Refill     risperiDONE (RISPERDAL) 3 MG tablet Take 3 mg by mouth every morning And 2 mg qhs       acetaminophen (TYLENOL) 500 MG tablet Take 1,000 mg by mouth 2 times daily And every day prn       atenolol (TENORMIN) 25 MG tablet Take 25 mg by mouth daily       divalproex sodium delayed-release (DEPAKOTE) 125 MG DR tablet Take 125 mg by mouth 2 times daily       docusate sodium (COLACE) 100 MG capsule Take 100 mg by mouth daily       donepezil (ARICEPT) 5 MG tablet Take 5 mg by mouth At Bedtime       haloperidol (HALDOL) 0.5 MG tablet Take 0.5 mg by mouth 2 times daily as needed for agitation       hyoscyamine (LEVSIN) 0.125 MG tablet Take 0.125 mg by mouth every 6 hours as needed for cramping       ibuprofen (ADVIL/MOTRIN) 400 MG tablet Take 400 mg by mouth 2 times daily as needed for moderate pain        lisinopril-hydrochlorothiazide (PRINZIDE/ZESTORETIC) 20-25 MG tablet Take 1 tablet by mouth daily       LORazepam (ATIVAN) 0.5 MG tablet Take 0.5 mg by mouth every 4 hours as needed for anxiety       morphine 2.5 MG solu-tab Take 2.5 mg by mouth every hour as needed for shortness of breath / dyspnea or moderate to severe pain       sertraline (ZOLOFT) 50 MG tablet Take 75 mg by mouth daily       simvastatin (ZOCOR) 20 MG tablet Take 20 mg by mouth At Bedtime       traZODone (DESYREL) 50 MG tablet Take 25 mg by mouth nightly as needed for sleep       trolamine salicylate (ASPERCREME) 10 % external cream Apply 1 g topically 2 times daily           REVIEW OF SYSTEMS:  Unobtainable secondary to cognitive impairment.     Objective:  BP (!) 140/80   Pulse 79   Resp 20   Wt 67.1 kg (148 lb)   SpO2 93%   Exam:  GENERAL APPEARANCE:  Alert, in no distress, cooperative  ENT:  Mouth and posterior oropharynx normal, moist mucous membranes, no rhinitis  EYES:  EOM, conjunctivae, lids, pupils and irises normal, PERRL  RESP:  respiratory effort and palpation of chest normal, lungs clear to auscultation , no respiratory distress  CV:  Palpation and auscultation of heart done , regular rate and rhythm, no murmur, rub, or gallop, no edema  ABDOMEN:  normal bowel sounds, soft, nontender, no hepatosplenomegaly or other masses, no guarding or rebound  M/S:   muscle strength 5/5 UEs, gen. LE weakness  NEURO:   Cranial nerves 2-12 are normal tested and grossly at patient's baseline  PSYCH:  affect abnormal -flat, no apparent anxiety    Labs:     Most Recent 3 CBC's:Recent Labs   Lab Test 10/01/19  1719 10/01/19  0000   WBC 9.1 9.1   HGB 14.4 14.4   MCV 92 92    293     Most Recent 3 BMP's:Recent Labs   Lab Test 04/28/20  1110 04/28/20  0000 02/05/20  1120    137 136   POTASSIUM 3.9 3.9 3.9   CHLORIDE 100 100 100   CO2 28 28 26   BUN 26 26 17   CR 1.20 1.20 1.27   ANIONGAP 9 9 10   ALOK 9.1 9.1 9.6    122 127*        ASSESSMENT/PLAN:  (I10) Essential hypertension  (primary encounter diagnosis)  Comment: BPs gen. Stable. occ bradycardia  Plan: 1. Cont. Zestoretic, atenolol  2. Follow BPs, HRs  3. Monitor for bradycardia  4. Encourage fluids    (G31.09,  F02.80) Frontal lobe dementia (H)  Comment: memory loss. Decreased verbalizations.  Wt. Stable past 3 mos  Plan: 1. Cont. Risperdal, depakote, aricept, zoloft.  2. Monitor for increased agitation  3. Cont. Hospice cares  4. Follow po intake, wt.s    (M54.5) Midline low back pain without sciatica, unspecified chronicity  Comment: gen. Stable. Decreased amb.   Plan: 1. Cont. Tylenol  2. Cont. Prn aspercreme, ibuprofen  3. For reports of increased back pain, may sched. Ibuprofen short-term    Electronically signed by:  SAVANNAH Seymour CNP                 Sincerely,        SAVANNAH Seymour CNP

## 2021-09-01 NOTE — PROGRESS NOTES
[place] [facility]  [place] Medical Record Number:  [id number]  Place of Service where encounter took place:  [facility] ([facility]) [[id number]]  Chief Complaint   Patient presents with     Hypertension       HPI:    Eduardo Singh  is a 75 year old (1945), who is being seen today for an episodic care visit.  HPI information obtained from: facility chart records, facility staff and [facility] chart review. Today's concern is: HTN, dementia, back pain.  For HTN cont.on zestoretic, atenolol.  BPs gen. Stable. occ bradycardia.  HR stable. Today. For dementia cont. On depakote, aricept, risperdal. Decreased verbalizations.  No recent reports of increased agitation or aggressive behaviors.  Cont. Hospice cares.  No recent reports of back pain.  Cont. On tylenol, prn aspercreme, prn ibuprofen.  Has decrease in amb. Does foot propel in broda chair.       Past Medical and Surgical History reviewed in Epic today.    MEDICATIONS:    Current Outpatient Medications   Medication Sig Dispense Refill     risperiDONE (RISPERDAL) 3 MG tablet Take 3 mg by mouth every morning And 2 mg qhs       acetaminophen (TYLENOL) 500 MG tablet Take 1,000 mg by mouth 2 times daily And every day prn       atenolol (TENORMIN) 25 MG tablet Take 25 mg by mouth daily       divalproex sodium delayed-release (DEPAKOTE) 125 MG DR tablet Take 125 mg by mouth 2 times daily       docusate sodium (COLACE) 100 MG capsule Take 100 mg by mouth daily       donepezil (ARICEPT) 5 MG tablet Take 5 mg by mouth At Bedtime       haloperidol (HALDOL) 0.5 MG tablet Take 0.5 mg by mouth 2 times daily as needed for agitation       hyoscyamine (LEVSIN) 0.125 MG tablet Take 0.125 mg by mouth every 6 hours as needed for cramping       ibuprofen (ADVIL/MOTRIN) 400 MG tablet Take 400 mg by mouth 2 times daily as needed for moderate pain       lisinopril-hydrochlorothiazide (PRINZIDE/ZESTORETIC) 20-25 MG tablet Take 1 tablet by mouth daily        LORazepam (ATIVAN) 0.5 MG tablet Take 0.5 mg by mouth every 4 hours as needed for anxiety       morphine 2.5 MG solu-tab Take 2.5 mg by mouth every hour as needed for shortness of breath / dyspnea or moderate to severe pain       sertraline (ZOLOFT) 50 MG tablet Take 75 mg by mouth daily       simvastatin (ZOCOR) 20 MG tablet Take 20 mg by mouth At Bedtime       traZODone (DESYREL) 50 MG tablet Take 25 mg by mouth nightly as needed for sleep       trolamine salicylate (ASPERCREME) 10 % external cream Apply 1 g topically 2 times daily           REVIEW OF SYSTEMS:  Unobtainable secondary to cognitive impairment.     Objective:  BP (!) 140/80   Pulse 79   Resp 20   Wt 67.1 kg (148 lb)   SpO2 93%   Exam:  GENERAL APPEARANCE:  Alert, in no distress, cooperative  ENT:  Mouth and posterior oropharynx normal, moist mucous membranes, no rhinitis  EYES:  EOM, conjunctivae, lids, pupils and irises normal, PERRL  RESP:  respiratory effort and palpation of chest normal, lungs clear to auscultation , no respiratory distress  CV:  Palpation and auscultation of heart done , regular rate and rhythm, no murmur, rub, or gallop, no edema  ABDOMEN:  normal bowel sounds, soft, nontender, no hepatosplenomegaly or other masses, no guarding or rebound  M/S:   muscle strength 5/5 UEs, gen. LE weakness  NEURO:   Cranial nerves 2-12 are normal tested and grossly at patient's baseline  PSYCH:  affect abnormal -flat, no apparent anxiety    Labs:     Most Recent 3 CBC's:Recent Labs   Lab Test 10/01/19  1719 10/01/19  0000   WBC 9.1 9.1   HGB 14.4 14.4   MCV 92 92    293     Most Recent 3 BMP's:Recent Labs   Lab Test 04/28/20  1110 04/28/20  0000 02/05/20  1120    137 136   POTASSIUM 3.9 3.9 3.9   CHLORIDE 100 100 100   CO2 28 28 26   BUN 26 26 17   CR 1.20 1.20 1.27   ANIONGAP 9 9 10   ALOK 9.1 9.1 9.6    122 127*       ASSESSMENT/PLAN:  (I10) Essential hypertension  (primary encounter diagnosis)  Comment: BPs gen. Stable.  occ bradycardia  Plan: 1. Cont. Zestoretic, atenolol  2. Follow BPs, HRs  3. Monitor for bradycardia  4. Encourage fluids    (G31.09,  F02.80) Frontal lobe dementia (H)  Comment: memory loss. Decreased verbalizations.  Wt. Stable past 3 mos  Plan: 1. Cont. Risperdal, depakote, aricept, zoloft.  2. Monitor for increased agitation  3. Cont. Hospice cares  4. Follow po intake, wt.s    (M54.5) Midline low back pain without sciatica, unspecified chronicity  Comment: gen. Stable. Decreased amb.   Plan: 1. Cont. Tylenol  2. Cont. Prn aspercreme, ibuprofen  3. For reports of increased back pain, may sched. Ibuprofen short-term    Electronically signed by:  SAVANNAH Seymour CNP

## 2021-09-09 NOTE — PROGRESS NOTES
"[place] [facility]   Eduardo Singh is being evaluated via a billable video visit.   The patient has been notified of following:  \"This video visit will be conducted via a call between you and your provider. We have found that certain health care needs can be provided without the need for an in-person physical exam.  This service lets us provide the care you need with a video conversation. If during the course of the call the provider feels a video visit is not appropriate, you will not be charged for this service.\"   The provider has received verbal consent for a Video Visit from the patient or first contact? Yes  Patient  or facility staff would like the video invitation sent by: Text to cell phone: [phone]  Video Start Time: 1016  [place] Medical Record Number: [id number]  Place of Location at the time of visit: [facility]   Chief Complaint   Patient presents with     Video Visit     Pain     HPI: Eduardo Singh is a 74 year old (1945), who is being seen today for a visit. HPI information obtained from: facility chart records, facility staff and [facility] chart review. Today's concern is: back pain, LLE stiffness, dementia.  S/p incident of falling into another resident 10/17/20. Did not fall to floor.  Prior to fall, staff noted resident bending over at times, rubbing low back.  Also noted to have some stiffness of LLE.  Not currently taking pain meds. Gait gen. Stable, not favoring LLE. Has had increased insomnia past few nights, some increased episodes of agitation-receives prn depakote and seroquel in addition to sched. Doses.       Past Medical and Surgical History reviewed in Epic today.  MEDICATIONS:    Current Outpatient Medications   Medication Sig Dispense Refill     acetaminophen (TYLENOL) 500 MG tablet Take 1,000 mg by mouth 2 times daily And every day prn       QUEtiapine (SEROQUEL) 50 MG tablet Take 50 mg by mouth 2 times daily And 75 mg at " [facility] [place]     Epogen Visit with Peripheral Lab Draw    NAME:  Sangeeta Randhawa OF BIRTH:  1934  MEDICAL RECORD NUMBER:  [id number]  Episode Date:  9/9/2021    Patient arrived to [facility]   [x] per wheelchair   [] ambulatory    Peripheral Lab Draw    Blood Draw Site: Location:right arm  Site cleansed with Chloroprep Scrub for 30 seconds: Yes  Labs drawn with: 23 gauge             [x] Butterfly    [] Needle  Labs Obtained: Yes  Number of attempts: 1    Lab Test(s) Ordered: STAT Hemoglobin    Lab Draw Site:  Redness: No  Bruising: No   Edema: No  Pain: No       Epogen Visit     Is the patient experiencing any:  Fatigue:   []   None  [x]   Increase over baseline but not altering normal activities - patient states that she is not really tired but after she has PT, she has to rst and take a nap  []   Moderate of causing difficulty performing some activities  []   Severe or loss of ability to perform some activities  []   Bedridden or disabling    Dizziness or Lightheadedness:   [x]   None  []   No Interference  []   Interferes with functioning but not activities of daily living  []   Interferes with daily activies  []   Bedridden or disabling    Shortness of Breath:   []   None   [x]   Dyspneic on exertion - when doing PT only   []   Dyspnea with normal activities  []   Dyspnea at rest    Edema: +1 pitting edema right ankle to mid calf; unable to assess left lower leg due to being wrapped with an ACE wrap    Tachycardia: No    Heart Palpitations: No      Chest Pain: No     Hold ZARINA dose if B/P is greater than: 180 mm/Hg     If Hgb remains less than 10 Increase dose by 25%. Do not increase dose more frequently than once every 4 weeks. If Hgb 10-10.5 g/dl  Continue same dose  If Hgb 10.6-11 g/dl Decrease dose by 25%. A decrease in dose can be done as frequently as every week. If Hgb is greater than 11 g/dl Hold Epogen.   May resume ZARINA when Hgb is bedtime.  seroquel 50 mg bid prn       trolamine salicylate (ASPERCREME) 10 % external cream Apply 1 g topically 2 times daily       atenolol (TENORMIN) 50 MG tablet Take 50 mg by mouth daily       divalproex sodium delayed-release (DEPAKOTE) 125 MG DR tablet Take 250 mg by mouth every morning And 125 mg bid       docusate sodium (COLACE) 100 MG capsule Take 100 mg by mouth daily       donepezil (ARICEPT) 10 MG tablet Take 10 mg by mouth At Bedtime       lisinopril-hydrochlorothiazide (PRINZIDE/ZESTORETIC) 20-25 MG tablet Take 1 tablet by mouth daily       sertraline (ZOLOFT) 50 MG tablet Take 75 mg by mouth daily       simvastatin (ZOCOR) 20 MG tablet Take 20 mg by mouth At Bedtime       REVIEW OF SYSTEMS: Unobtainable secondary to cognitive impairment.   Objective: /88   Pulse 61   Temp 98.2  F (36.8  C)   Resp 16   SpO2 93%   Limited visit exam done given COVID-19 precautions.   GENERAL APPEARANCE:  Alert, in no distress, cooperative  ENT:  Mouth and posterior oropharynx normal, moist mucous membranes, [place]  EYES:  EOM normal, Conjunctiva and lids normal  NECK:  FROM  RESP:  lungs clear to auscultation , no respiratory distress  CV:  no edema, rate-normal  M/S:   Gait and station normal  muscle strength 5/5 all 4 ext.  NEURO:   Cranial nerves 2-12 are normal tested and grossly at patient's baseline  PSYCH:  insight and judgement impaired, memory impaired , affect and mood normal  Labs:     Most Recent 3 BMP's:  Recent Labs   Lab Test 04/28/20 02/05/20 10/01/19    136 132*   POTASSIUM 3.9 3.9 3.8   CHLORIDE 100 100 94*   CO2 28 26 28   BUN 26 17 19   CR 1.20 1.27 1.21   ANIONGAP 9 10 10   ALOK 9.1 9.6 9.6    127* 118       ASSESSMENT/PLAN:  Acute midline low back pain without sciatica  Newer onset. Observed to rub low back at times. Recent fall  1. Start sched., prn tylenol  2. Start bid aspercreme  3. Reassess over next couple days  4. If pain not improved, consider lumbar spine XR    Joint  less than 10 with a 25% reduction in the dose from the last administered dose or decrease with a 25% reduction in the dose from the last administered dose or decrease  frequency. Last visit date: 8/26/2021      Last Hgb: 9.8 g/dl   Last dose: 25,000 units    Current Lab Data:    Recent Labs     09/09/21  1321   HGB 10.7*     Dose will be decreased per therapy plan parameters    Epogen dosage: 19,000 units was administered slowly subcutaneously into the right upper arm. Dose verified by:  Cherylene Feather, RN    Response to treatment:  Well tolerated by patient. Education: Verbal and written discharge instructions reviewed with patient and daughter. Both verbalized understanding. Written copy given via AVS    Scheduled to return for next dose of Epogen on September 23, 2021 .      Electronically signed by Adán Perez RN on 9/9/2021 at 5:55 PM stiffness of left lower leg  Newer onset, appears to coincide with low back pain  1. Tylenol as above  2. Monitor for LE weakness  3. Monitor for changes in gait, falls  4. Encourage amb. As jin    Frontal lobe dementia (H)  Memory loss, increased freq. Of agitation, insomnia over past few days, may be r/t #1  1. tx pain as above  2. Cont. Sched. Seroquel, increase prn seroquel to 50 mg bid  3. Cont. Sched, prn depakote  4. Cont. zoloft  5. For ongoing insomnia, consider alt. Antidepressant-remeron        Electronically signed by:  SAVANNAH Seymour CNP     Video-Visit Details  Type of service:  Video Visit  Video End Time (time video stopped): 1026  Distant Location (provider location):  [facility]

## 2021-10-06 NOTE — LETTER
10/6/2021        RE: Eduardo Singh  C/o Leonor Singh  [address] [address]        [place] [facility]  [place] Medical Record Number:  [id number]  Place of Service where encounter took place:  [facility] ([facility]) [233]  Chief Complaint   Patient presents with     Fall       HPI:    Eduardo Singh  is a 75 year old (1945), who is being seen today for an episodic care visit.  HPI information obtained from: facility chart records, facility staff and [facility] chart review. Today's concern is: falls, HTN, back pain.  S/p fall out of bed last pm.  Has skin tear R forearm.  No other apparent inj.  Cont on hospice cares.  Has low bed with perimeter mattress, pad on floor.  Per staff. Occ. Attempts to get out of bed.  No reports of back pain.  Has ongoing decrease in amb-typically requires staff assist. During day foot propepls self in broda chair. For pain cont on tylenol, prn ibuprofen, MS.  BPs variable, SBP in 90s at times. HRs in 50s at times.  BP stable today. Cont. On atenolol, zestoretic.       Past Medical and Surgical History reviewed in Epic today.    MEDICATIONS:    Current Outpatient Medications   Medication Sig Dispense Refill     acetaminophen (TYLENOL) 500 MG tablet Take 1,000 mg by mouth 2 times daily And every day prn       atenolol (TENORMIN) 25 MG tablet Take 25 mg by mouth daily       divalproex sodium delayed-release (DEPAKOTE) 125 MG DR tablet Take 125 mg by mouth 2 times daily       docusate sodium (COLACE) 100 MG capsule Take 100 mg by mouth daily       haloperidol (HALDOL) 0.5 MG tablet Take 0.5 mg by mouth 2 times daily as needed for agitation       hyoscyamine (LEVSIN) 0.125 MG tablet Take 0.125 mg by mouth every 6 hours as needed for cramping       ibuprofen (ADVIL/MOTRIN) 400 MG tablet Take 400 mg by mouth 2 times daily as needed for moderate pain       lisinopril-hydrochlorothiazide (PRINZIDE/ZESTORETIC) 20-25 MG tablet Take 1  tablet by mouth daily       LORazepam (ATIVAN) 0.5 MG tablet Take 0.5 mg by mouth every 4 hours as needed for anxiety       morphine 2.5 MG solu-tab Take 2.5 mg by mouth every hour as needed for shortness of breath / dyspnea or moderate to severe pain       risperiDONE (RISPERDAL) 3 MG tablet Take 3 mg by mouth every morning And 2 mg qhs       sertraline (ZOLOFT) 50 MG tablet Take 75 mg by mouth daily       traZODone (DESYREL) 50 MG tablet Take 25 mg by mouth nightly as needed for sleep       trolamine salicylate (ASPERCREME) 10 % external cream Apply 1 g topically 2 times daily           REVIEW OF SYSTEMS:  Unobtainable secondary to cognitive impairment.     Objective:  BP (!) 149/89   Pulse 59   Resp 18   SpO2 93%   Exam:  GENERAL APPEARANCE:  Alert, in no distress  ENT:  Mouth and posterior oropharynx normal, moist mucous membranes, [place]  EYES:  EOM, conjunctivae, lids, pupils and irises normal, PERRL  NECK:  FROM  RESP:  respiratory effort and palpation of chest normal, lungs clear to auscultation , no respiratory distress  CV:  Palpation and auscultation of heart done , regular rate and rhythm, no murmur, rub, or gallop, no edema  ABDOMEN:  normal bowel sounds, soft, nontender, no hepatosplenomegaly or other masses, no guarding or rebound  M/S:   muscle strength 5/5 UEs, gen. LE weakness.  no apparent back pain with PROM LEs  NEURO:   Cranial nerves 2-12 are normal tested and grossly at patient's baseline, no tremor  PSYCH:  affect abnormal -flat, no apparent anxiety    Labs:     Most Recent 3 BMP's:Recent Labs   Lab Test 04/28/20  1110 04/28/20  0000 02/05/20  1120    137 136   POTASSIUM 3.9 3.9 3.9   CHLORIDE 100 100 100   CO2 28 28 26   BUN 26 26 17   CR 1.20 1.20 1.27   ANIONGAP 9 9 10   ALOK 9.1 9.1 9.6    122 127*       ASSESSMENT/PLAN:  (R29.6) Recurrent falls  (primary encounter diagnosis)  Comment: s/p fall last pm, attempt to self-transfrer OOB, skin tear R forearm  Plan: 1. Cont. Low  bed, perimeter mattress. Floor pad  2. Staff to pad metal bed frame edge  3. Monitor for further attempts to self-transfer  4. Cont. Hospice cares    (I10) Essential hypertension  Comment: variable BPs, occ SBPs 90s  Plan: 1. Cont. Zestoretic, atenolol  2. Start hold parameter for zestoretic if SBP < 100  3. For further increase in bradycardia, may discontinue atenolol  4. Encourage fluids    (M54.50) Midline low back pain without sciatica, unspecified chronicity  Comment: currently stable.  Decrease in amb. LE weakness  Plan: 1 cont. Tylenol  2. Cont. aspercreme to back  3. Encourage foot propel when in Broda chair  4. Cont. Prn MS for more sig. pain    Electronically signed by:  SAVANNAH Seymour CNP                 Sincerely,        SAVANNAH Seymour CNP

## 2021-10-06 NOTE — PROGRESS NOTES
[place] [facility]  [place] Medical Record Number:  [id number]  [facility] [233]  Chief Complaint   Patient presents with     Fall       HPI:    Eduardo Singh  is a 75 year old (1945), who is being seen today for an episodic care visit.  HPI information obtained from: facility chart records, facility staff and [facility] chart review. Today's concern is: falls, HTN, back pain.  S/p fall out of bed last pm.  Has skin tear R forearm.  No other apparent inj.  Cont on hospice cares.  Has low bed with perimeter mattress, pad on floor.  Per staff. Occ. Attempts to get out of bed.  No reports of back pain.  Has ongoing decrease in amb-typically requires staff assist. During day foot propepls self in broda chair. For pain cont on tylenol, prn ibuprofen, MS.  BPs variable, SBP in 90s at times. HRs in 50s at times.  BP stable today. Cont. On atenolol, zestoretic.       Past Medical and Surgical History reviewed in Epic today.    MEDICATIONS:    Current Outpatient Medications   Medication Sig Dispense Refill     acetaminophen (TYLENOL) 500 MG tablet Take 1,000 mg by mouth 2 times daily And every day prn       atenolol (TENORMIN) 25 MG tablet Take 25 mg by mouth daily       divalproex sodium delayed-release (DEPAKOTE) 125 MG DR tablet Take 125 mg by mouth 2 times daily       docusate sodium (COLACE) 100 MG capsule Take 100 mg by mouth daily       haloperidol (HALDOL) 0.5 MG tablet Take 0.5 mg by mouth 2 times daily as needed for agitation       hyoscyamine (LEVSIN) 0.125 MG tablet Take 0.125 mg by mouth every 6 hours as needed for cramping       ibuprofen (ADVIL/MOTRIN) 400 MG tablet Take 400 mg by mouth 2 times daily as needed for moderate pain       lisinopril-hydrochlorothiazide (PRINZIDE/ZESTORETIC) 20-25 MG tablet Take 1 tablet by mouth daily       LORazepam (ATIVAN) 0.5 MG tablet Take 0.5 mg by mouth every 4 hours as needed for  anxiety       morphine 2.5 MG solu-tab Take 2.5 mg by mouth every hour as needed for shortness of breath / dyspnea or moderate to severe pain       risperiDONE (RISPERDAL) 3 MG tablet Take 3 mg by mouth every morning And 2 mg qhs       sertraline (ZOLOFT) 50 MG tablet Take 75 mg by mouth daily       traZODone (DESYREL) 50 MG tablet Take 25 mg by mouth nightly as needed for sleep       trolamine salicylate (ASPERCREME) 10 % external cream Apply 1 g topically 2 times daily           REVIEW OF SYSTEMS:  Unobtainable secondary to cognitive impairment.     Objective:  BP (!) 149/89   Pulse 59   Resp 18   SpO2 93%   Exam:  GENERAL APPEARANCE:  Alert, in no distress  ENT:  Mouth and posterior oropharynx normal, moist mucous membranes, [place]  EYES:  EOM, conjunctivae, lids, pupils and irises normal, PERRL  NECK:  FROM  RESP:  respiratory effort and palpation of chest normal, lungs clear to auscultation , no respiratory distress  CV:  Palpation and auscultation of heart done , regular rate and rhythm, no murmur, rub, or gallop, no edema  ABDOMEN:  normal bowel sounds, soft, nontender, no hepatosplenomegaly or other masses, no guarding or rebound  M/S:   muscle strength 5/5 UEs, gen. LE weakness.  no apparent back pain with PROM LEs  NEURO:   Cranial nerves 2-12 are normal tested and grossly at patient's baseline, no tremor  PSYCH:  affect abnormal -flat, no apparent anxiety    Labs:     Most Recent 3 BMP's:Recent Labs   Lab Test 04/28/20  1110 04/28/20  0000 02/05/20  1120    137 136   POTASSIUM 3.9 3.9 3.9   CHLORIDE 100 100 100   CO2 28 28 26   BUN 26 26 17   CR 1.20 1.20 1.27   ANIONGAP 9 9 10   ALOK 9.1 9.1 9.6    122 127*       ASSESSMENT/PLAN:  (R29.6) Recurrent falls  (primary encounter diagnosis)  Comment: s/p fall last pm, attempt to self-transfrer OOB, skin tear R forearm  Plan: 1. Cont. Low bed, perimeter mattress. Floor pad  2. Staff to pad metal bed frame edge  3. Monitor for further attempts to  self-transfer  4. Cont. Hospice cares    (I10) Essential hypertension  Comment: variable BPs, occ SBPs 90s  Plan: 1. Cont. Zestoretic, atenolol  2. Start hold parameter for zestoretic if SBP < 100  3. For further increase in bradycardia, may discontinue atenolol  4. Encourage fluids    (M54.50) Midline low back pain without sciatica, unspecified chronicity  Comment: currently stable.  Decrease in amb. LE weakness  Plan: 1 cont. Tylenol  2. Cont. aspercreme to back  3. Encourage foot propel when in Broda chair  4. Cont. Prn MS for more sig. pain    Electronically signed by:  SAVANNAH Seymour CNP

## 2021-11-03 NOTE — LETTER
11/3/2021        RE: Eduardo Singh  C/o Leonor Singh  [address] [address]        [place] [facility]  [place] Medical Record Number:  [id number]  Place of Service where encounter took place:  [facility] ([facility]) [233]  Chief Complaint   Patient presents with     Pain       HPI:    Eduardo Singh  is a 75 year old (1945), who is being seen today for an episodic care visit.  HPI information obtained from: facility chart records, facility staff and [facility] chart review. Today's concern is: pain, HTN, dementia.  Cont. On hospice cares.  Over past month has had further decline in act. Level.  More recently has had sig decrease in po intake. Spending more time in bed.  Has not been opening eyes much or responding to verbal stimulus. At times has facial grimacing.  Cont. On tylenol, prn MS for pain. Having some decrease in ability to swallow pills at times.  BP stable.  Cont. On zestoretic, atenolol for HTN.  BPs stable. Has had decrease in fluid intake. For dementia cont. On depakote, ativan, zoloft, risperdal, pr haldol.  No recent reports of increased restlessness or agitation.       Past Medical and Surgical History reviewed in Epic today.    MEDICATIONS:    Current Outpatient Medications   Medication Sig Dispense Refill     acetaminophen (TYLENOL) 500 MG tablet Take 1,000 mg by mouth 2 times daily And every day prn       atenolol (TENORMIN) 25 MG tablet Take 25 mg by mouth daily       divalproex sodium delayed-release (DEPAKOTE) 125 MG DR tablet Take 125 mg by mouth 2 times daily       docusate sodium (COLACE) 100 MG capsule Take 100 mg by mouth daily       haloperidol (HALDOL) 0.5 MG tablet Take 0.5 mg by mouth 2 times daily as needed for agitation       hyoscyamine (LEVSIN) 0.125 MG tablet Take 0.125 mg by mouth every 6 hours as needed for cramping       ibuprofen (ADVIL/MOTRIN) 400 MG tablet Take 400 mg by mouth 2 times daily as needed for moderate  pain       lisinopril-hydrochlorothiazide (PRINZIDE/ZESTORETIC) 20-25 MG tablet Take 1 tablet by mouth daily       LORazepam (ATIVAN) 0.5 MG tablet Take 0.5 mg by mouth every 4 hours as needed for anxiety       morphine 2.5 MG solu-tab Take 2.5 mg by mouth every hour as needed for shortness of breath / dyspnea or moderate to severe pain       risperiDONE (RISPERDAL) 3 MG tablet Take 3 mg by mouth every morning And 2 mg qhs       sertraline (ZOLOFT) 50 MG tablet Take 75 mg by mouth daily       traZODone (DESYREL) 50 MG tablet Take 25 mg by mouth nightly as needed for sleep       trolamine salicylate (ASPERCREME) 10 % external cream Apply 1 g topically 2 times daily           REVIEW OF SYSTEMS:  Unobtainable secondary to cognitive impairment.     Objective:  /78   Pulse 66   Resp 16   Exam:  GENERAL APPEARANCE:  in no distress  ENT:  Mouth and posterior oropharynx normal, moist mucous membranes, no rhinitis  EYES:  no apparent drainage  NECK:  FROM, able to hold head up while in chair  RESP:  respiratory effort and palpation of chest normal, lungs clear to auscultation , no respiratory distress  CV:  Palpation and auscultation of heart done , regular rate and rhythm, no murmur, rub, or gallop, no edema  ABDOMEN:  normal bowel sounds, soft, nontender, no hepatosplenomegaly or other masses, no guarding or rebound  M/S:   moves head, neck, gen. weakness.  NEURO:   decreased responsiveness, does not open eyes  PSYCH:  no apparent anxiety or restlessness    Labs:     Most Recent 3 BMP's:Recent Labs   Lab Test 04/28/20  1110 04/28/20  0000 02/05/20  1120    137 136   POTASSIUM 3.9 3.9 3.9   CHLORIDE 100 100 100   CO2 28 28 26   BUN 26 26 17   CR 1.20 1.20 1.27   ANIONGAP 9 9 10   ALOK 9.1 9.1 9.6    122 127*       ASSESSMENT/PLAN:  (R52) Pain  (primary encounter diagnosis)  Comment: occ s/s with facial grimacing  Plan: 1. Cont. Tylenol, prn MS  2. Cont. aspercreme  3. For increase s/s pain, may sched. MS  doses  4. Cont. Freq. Repositioning while in bed  5. Monitor for skin breakdown    (I10) Essential hypertension  Comment: BPs currently stable.  Some recent decrease in fluid intake  Plan: 1. Follow  BPs, HRs  2. For further decrease in fluid intake, diff. Swallowing pills, plan to discontinue zestoretic, atenolol  3. 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 Decreased responsiveness. No recent increase in restlessness.  Sig. Decrease in po intake  Plan: 1. Cont. Hospice cares  2. Cont. Current psych meds  3. For difficulty swallowing pills, may discontinue all psych meds except prn haldol, ativan    Electronically signed by:  SAVANNAH Seymour CNP                 Sincerely,        SAVANNAH Seymour CNP

## 2021-11-03 NOTE — PROGRESS NOTES
[facility]  [facility] Record Number:  [id number]  Place of Service where encounter took place:  [facility] ([facility]) [[id number]]  Chief Complaint   Patient presents with     Pain       HPI:    Eduardo Singh  is a 75 year old (1945), who is being seen today for an episodic care visit.  HPI information obtained from: facility chart records, facility staff and [facility] chart review. Today's concern is: pain, HTN, dementia.  Cont. On hospice cares.  Over past month has had further decline in act. Level.  More recently has had sig decrease in po intake. Spending more time in bed.  Has not been opening eyes much or responding to verbal stimulus. At times has facial grimacing.  Cont. On tylenol, prn MS for pain. Having some decrease in ability to swallow pills at times.  BP stable.  Cont. On zestoretic, atenolol for HTN.  BPs stable. Has had decrease in fluid intake. For dementia cont. On depakote, ativan, zoloft, risperdal, pr haldol.  No recent reports of increased restlessness or agitation.       Past Medical and Surgical History reviewed in Epic today.    MEDICATIONS:    Current Outpatient Medications   Medication Sig Dispense Refill     acetaminophen (TYLENOL) 500 MG tablet Take 1,000 mg by mouth 2 times daily And every day prn       atenolol (TENORMIN) 25 MG tablet Take 25 mg by mouth daily       divalproex sodium delayed-release (DEPAKOTE) 125 MG DR tablet Take 125 mg by mouth 2 times daily       docusate sodium (COLACE) 100 MG capsule Take 100 mg by mouth daily       haloperidol (HALDOL) 0.5 MG tablet Take 0.5 mg by mouth 2 times daily as needed for agitation       hyoscyamine (LEVSIN) 0.125 MG tablet Take 0.125 mg by mouth every 6 hours as needed for cramping       ibuprofen (ADVIL/MOTRIN) 400 MG tablet Take 400 mg by mouth 2 times daily as needed for moderate pain       lisinopril-hydrochlorothiazide (PRINZIDE/ZESTORETIC) 20-25 MG tablet Take 1 tablet by mouth daily        LORazepam (ATIVAN) 0.5 MG tablet Take 0.5 mg by mouth every 4 hours as needed for anxiety       morphine 2.5 MG solu-tab Take 2.5 mg by mouth every hour as needed for shortness of breath / dyspnea or moderate to severe pain       risperiDONE (RISPERDAL) 3 MG tablet Take 3 mg by mouth every morning And 2 mg qhs       sertraline (ZOLOFT) 50 MG tablet Take 75 mg by mouth daily       traZODone (DESYREL) 50 MG tablet Take 25 mg by mouth nightly as needed for sleep       trolamine salicylate (ASPERCREME) 10 % external cream Apply 1 g topically 2 times daily           REVIEW OF SYSTEMS:  Unobtainable secondary to cognitive impairment.     Objective:  /78   Pulse 66   Resp 16   Exam:  GENERAL APPEARANCE:  in no distress  ENT:  Mouth and posterior oropharynx normal, moist mucous membranes, no rhinitis  EYES:  no apparent drainage  NECK:  FROM, able to hold head up while in chair  RESP:  respiratory effort and palpation of chest normal, lungs clear to auscultation , no respiratory distress  CV:  Palpation and auscultation of heart done , regular rate and rhythm, no murmur, rub, or gallop, no edema  ABDOMEN:  normal bowel sounds, soft, nontender, no hepatosplenomegaly or other masses, no guarding or rebound  M/S:   moves head, neck, gen. weakness.  NEURO:   decreased responsiveness, does not open eyes  PSYCH:  no apparent anxiety or restlessness    Labs:     Most Recent 3 BMP's:Recent Labs   Lab Test 04/28/20  1110 04/28/20  0000 02/05/20  1120    137 136   POTASSIUM 3.9 3.9 3.9   CHLORIDE 100 100 100   CO2 28 28 26   BUN 26 26 17   CR 1.20 1.20 1.27   ANIONGAP 9 9 10   ALOK 9.1 9.1 9.6    122 127*       ASSESSMENT/PLAN:  (R52) Pain  (primary encounter diagnosis)  Comment: occ s/s with facial grimacing  Plan: 1. Cont. Tylenol, prn MS  2. Cont. aspercreme  3. For increase s/s pain, may sched. MS doses  4. Cont. Freq. Repositioning while in bed  5. Monitor for skin breakdown    (I10) Essential  hypertension  Comment: BPs currently stable.  Some recent decrease in fluid intake  Plan: 1. Follow  BPs, HRs  2. For further decrease in fluid intake, diff. Swallowing pills, plan to discontinue zestoretic, atenolol  3. 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 Decreased responsiveness. No recent increase in restlessness.  Sig. Decrease in po intake  Plan: 1. Cont. Hospice cares  2. Cont. Current psych meds  3. For difficulty swallowing pills, may discontinue all psych meds except prn haldol, ativan    Electronically signed by:  SAVANNAH Seymour CNP

## 2021-11-30 ENCOUNTER — PATIENT OUTREACH (OUTPATIENT)
Dept: GERIATRIC MEDICINE | Facility: CLINIC | Age: 76
End: 2021-11-30
Payer: COMMERCIAL

## 2022-02-12 NOTE — PROGRESS NOTES
[employer] Medicare Disenrollment    Member was disenrolled from [employer] effective: 11-20-21  Reason for disenrollment: Death     Nivia WILSON   [facility] Care Coordinator   [phone] - work cell phone   [phone] - jtip fax